# Patient Record
Sex: FEMALE | Race: WHITE | NOT HISPANIC OR LATINO | Employment: FULL TIME | URBAN - METROPOLITAN AREA
[De-identification: names, ages, dates, MRNs, and addresses within clinical notes are randomized per-mention and may not be internally consistent; named-entity substitution may affect disease eponyms.]

---

## 2018-05-08 ENCOUNTER — HISTORICAL (OUTPATIENT)
Dept: LAB | Facility: HOSPITAL | Age: 58
End: 2018-05-08

## 2018-05-08 LAB
ABS NEUT (OLG): 2.62
ALBUMIN SERPL-MCNC: 3.6 GM/DL (ref 3.4–5)
ALBUMIN/GLOB SERPL: 1 RATIO (ref 1.1–2)
ALP SERPL-CCNC: 115 UNIT/L (ref 46–116)
ALT SERPL-CCNC: 22 UNIT/L (ref 12–78)
APPEARANCE, UA: ABNORMAL
AST SERPL-CCNC: 10 UNIT/L (ref 10–37)
BACTERIA SPEC CULT: ABNORMAL
BASOPHILS # BLD AUTO: 0.03 X10(3)/MCL
BASOPHILS NFR BLD AUTO: 0.7 %
BILIRUB SERPL-MCNC: 0.5 MG/DL (ref 0.2–1)
BILIRUB UR QL STRIP: NEGATIVE
BILIRUBIN DIRECT+TOT PNL SERPL-MCNC: 0.1 MG/DL (ref 0–0.2)
BILIRUBIN DIRECT+TOT PNL SERPL-MCNC: 0.4 MG/DL
BUN SERPL-MCNC: 14 MG/DL (ref 7–18)
CALCIUM SERPL-MCNC: 8.7 MG/DL (ref 8.5–10.1)
CHLORIDE SERPL-SCNC: 105 MMOL/L (ref 98–107)
CHOLEST SERPL-MCNC: 214 MG/DL (ref 50–200)
CHOLEST/HDLC SERPL: 4 {RATIO} (ref 0–5)
CO2 SERPL-SCNC: 27.9 MMOL/L (ref 21–32)
COLOR UR: YELLOW
CREAT SERPL-MCNC: 0.93 MG/DL (ref 0.55–1.02)
EOSINOPHIL # BLD AUTO: 0.16 X10(3)/MCL
EOSINOPHIL NFR BLD AUTO: 3.7 %
ERYTHROCYTE [DISTWIDTH] IN BLOOD BY AUTOMATED COUNT: 14 %
EST. AVERAGE GLUCOSE BLD GHB EST-MCNC: 117 MG/DL
GLOBULIN SER-MCNC: 3.6 GM/DL (ref 2.4–3.5)
GLUCOSE (UA): NEGATIVE
GLUCOSE SERPL-MCNC: 108 MG/DL (ref 74–106)
HBA1C MFR BLD: 5.7 % (ref 4.5–6.2)
HCT VFR BLD AUTO: 39.8 % (ref 34–46)
HDLC SERPL-MCNC: 60 MG/DL (ref 35–60)
HGB BLD-MCNC: 12.9 GM/DL (ref 11.3–15.4)
HGB UR QL STRIP: NEGATIVE
IMM GRANULOCYTES # BLD AUTO: 0 10*3/UL (ref 0–0.1)
IMM GRANULOCYTES NFR BLD AUTO: 0 % (ref 0–1)
KETONES UR QL STRIP: NEGATIVE
LDLC SERPL CALC-MCNC: 134 MG/DL (ref 50–140)
LEUKOCYTE ESTERASE UR QL STRIP: ABNORMAL
LYMPHOCYTES # BLD AUTO: 1.22 X10(3)/MCL
LYMPHOCYTES NFR BLD AUTO: 28 %
MCH RBC QN AUTO: 27.9 PG (ref 27–33)
MCHC RBC AUTO-ENTMCNC: 32.4 GM/DL (ref 32–35)
MCV RBC AUTO: 86 FL (ref 81–97)
MONOCYTES # BLD AUTO: 0.33 X10(3)/MCL
MONOCYTES NFR BLD AUTO: 7.6 %
MUCOUS THREADS URNS QL MICRO: PRESENT
NEUTROPHILS # BLD AUTO: 2.62 X10(3)/MCL
NEUTROPHILS NFR BLD AUTO: 60 %
NITRITE UR QL STRIP: NEGATIVE
PH UR STRIP: 7 [PH] (ref 4.6–8)
PLATELET # BLD AUTO: 319 X10(3)/MCL (ref 151–368)
PMV BLD AUTO: 11 FL
POTASSIUM SERPL-SCNC: 4.3 MMOL/L (ref 3.5–5.1)
PROT SERPL-MCNC: 7.2 GM/DL (ref 6.4–8.2)
PROT UR QL STRIP: NEGATIVE
RBC # BLD AUTO: 4.63 X10(6)/MCL (ref 3.9–5)
RBC #/AREA URNS HPF: ABNORMAL /[HPF]
SODIUM SERPL-SCNC: 141 MMOL/L (ref 136–145)
SP GR UR STRIP: 1.02 (ref 1–1.03)
SQUAMOUS EPITHELIAL, UA: ABNORMAL
TRIGL SERPL-MCNC: 100 MG/DL (ref 30–150)
TSH SERPL-ACNC: 2.27 MIU/ML (ref 0.35–3.75)
UROBILINOGEN UR STRIP-ACNC: 1
VLDLC SERPL CALC-MCNC: 20 MG/DL
WBC # SPEC AUTO: 4.36 X10(3)/MCL (ref 3.4–9.2)
WBC #/AREA URNS HPF: ABNORMAL /HPF

## 2018-05-10 LAB — FINAL CULTURE: NORMAL

## 2018-08-22 LAB
CRC RECOMMENDATION EXT: NORMAL
CRC RECOMMENDATION EXT: NORMAL

## 2020-11-16 LAB
HUMAN PAPILLOMAVIRUS (HPV): NORMAL
PAP RECOMMENDATION EXT: NORMAL
PAP SMEAR: NORMAL

## 2020-11-19 LAB
HUMAN PAPILLOMAVIRUS (HPV): NORMAL
PAP RECOMMENDATION EXT: NORMAL
PAP SMEAR: NORMAL

## 2021-02-08 LAB — BCS RECOMMENDATION EXT: NORMAL

## 2021-12-23 ENCOUNTER — HISTORICAL (OUTPATIENT)
Dept: ADMINISTRATIVE | Facility: HOSPITAL | Age: 61
End: 2021-12-23

## 2022-03-07 ENCOUNTER — HISTORICAL (OUTPATIENT)
Dept: LAB | Facility: HOSPITAL | Age: 62
End: 2022-03-07

## 2022-03-07 LAB
ALBUMIN SERPL-MCNC: 3.8 G/DL (ref 3.4–4.8)
ALBUMIN/GLOB SERPL: 1.4 {RATIO} (ref 1.1–2)
ALP SERPL-CCNC: 100 U/L (ref 40–150)
ALT SERPL-CCNC: 15 U/L (ref 0–55)
AST SERPL-CCNC: 15 U/L (ref 5–34)
BILIRUB SERPL-MCNC: 0.4 MG/DL
BILIRUBIN DIRECT+TOT PNL SERPL-MCNC: 0.1 (ref 0–0.5)
BILIRUBIN DIRECT+TOT PNL SERPL-MCNC: 0.3
BUN SERPL-MCNC: 12 MG/DL (ref 9.8–20.1)
CALCIUM SERPL-MCNC: 9.3 MG/DL (ref 8.7–10.5)
CHLORIDE SERPL-SCNC: 108 MMOL/L (ref 98–107)
CHOLEST SERPL-MCNC: 209 MG/DL
CHOLEST/HDLC SERPL: 4 {RATIO} (ref 0–5)
CO2 SERPL-SCNC: 28 MMOL/L (ref 23–31)
CREAT SERPL-MCNC: 0.74 MG/DL (ref 0.55–1.02)
GLOBULIN SER-MCNC: 2.8 G/DL (ref 2.4–3.5)
GLUCOSE SERPL-MCNC: 107 MG/DL (ref 82–115)
HDLC SERPL-MCNC: 50 MG/DL (ref 35–60)
HEMOLYSIS INTERF INDEX SERPL-ACNC: 0
ICTERIC INTERF INDEX SERPL-ACNC: 1
LDLC SERPL CALC-MCNC: 136 MG/DL (ref 50–140)
LIPEMIC INTERF INDEX SERPL-ACNC: -1
POTASSIUM SERPL-SCNC: 4.7 MMOL/L (ref 3.5–5.1)
PROT SERPL-MCNC: 6.6 G/DL (ref 5.8–7.6)
SODIUM SERPL-SCNC: 143 MMOL/L (ref 136–145)
TRIGL SERPL-MCNC: 115 MG/DL (ref 37–140)
VLDLC SERPL CALC-MCNC: 23 MG/DL

## 2022-04-09 ENCOUNTER — HISTORICAL (OUTPATIENT)
Dept: ADMINISTRATIVE | Facility: HOSPITAL | Age: 62
End: 2022-04-09

## 2022-04-27 VITALS
SYSTOLIC BLOOD PRESSURE: 151 MMHG | DIASTOLIC BLOOD PRESSURE: 89 MMHG | HEIGHT: 61 IN | BODY MASS INDEX: 39.46 KG/M2 | OXYGEN SATURATION: 94 % | WEIGHT: 209 LBS

## 2022-06-24 ENCOUNTER — LAB VISIT (OUTPATIENT)
Dept: LAB | Facility: HOSPITAL | Age: 62
End: 2022-06-24
Attending: NURSE PRACTITIONER
Payer: MEDICAID

## 2022-06-24 ENCOUNTER — CLINICAL SUPPORT (OUTPATIENT)
Dept: FAMILY MEDICINE | Facility: CLINIC | Age: 62
End: 2022-06-24
Payer: MEDICAID

## 2022-06-24 DIAGNOSIS — E78.5 HYPERLIPIDEMIA, UNSPECIFIED HYPERLIPIDEMIA TYPE: ICD-10-CM

## 2022-06-24 DIAGNOSIS — Z00.00 WELLNESS EXAMINATION: ICD-10-CM

## 2022-06-24 DIAGNOSIS — E78.5 HYPERLIPIDEMIA, UNSPECIFIED HYPERLIPIDEMIA TYPE: Primary | ICD-10-CM

## 2022-06-24 LAB
ALBUMIN SERPL-MCNC: 4 GM/DL (ref 3.4–4.8)
ALBUMIN/GLOB SERPL: 1.5 RATIO (ref 1.1–2)
ALP SERPL-CCNC: 99 UNIT/L (ref 40–150)
ALT SERPL-CCNC: 13 UNIT/L (ref 0–55)
APPEARANCE UR: ABNORMAL
AST SERPL-CCNC: 14 UNIT/L (ref 5–34)
BACTERIA #/AREA URNS AUTO: ABNORMAL /HPF
BASOPHILS # BLD AUTO: 0.05 X10(3)/MCL (ref 0–0.2)
BASOPHILS NFR BLD AUTO: 1 %
BILIRUB UR QL STRIP.AUTO: NEGATIVE MG/DL
BILIRUBIN DIRECT+TOT PNL SERPL-MCNC: 0.5 MG/DL
BUN SERPL-MCNC: 9 MG/DL (ref 9.8–20.1)
CALCIUM SERPL-MCNC: 8.8 MG/DL (ref 8.4–10.2)
CHLORIDE SERPL-SCNC: 107 MMOL/L (ref 98–107)
CHOLEST SERPL-MCNC: 188 MG/DL
CHOLEST/HDLC SERPL: 3 {RATIO} (ref 0–5)
CO2 SERPL-SCNC: 28 MMOL/L (ref 23–31)
COLOR UR AUTO: YELLOW
CREAT SERPL-MCNC: 0.82 MG/DL (ref 0.55–1.02)
EOSINOPHIL # BLD AUTO: 0.22 X10(3)/MCL (ref 0–0.9)
EOSINOPHIL NFR BLD AUTO: 4.3 %
ERYTHROCYTE [DISTWIDTH] IN BLOOD BY AUTOMATED COUNT: 14.1 % (ref 11.5–17)
GLOBULIN SER-MCNC: 2.7 GM/DL (ref 2.4–3.5)
GLUCOSE SERPL-MCNC: 98 MG/DL (ref 82–115)
GLUCOSE UR QL STRIP.AUTO: NEGATIVE MG/DL
HCT VFR BLD AUTO: 41.3 % (ref 37–47)
HDLC SERPL-MCNC: 63 MG/DL (ref 35–60)
HGB BLD-MCNC: 12.6 GM/DL (ref 12–16)
IMM GRANULOCYTES # BLD AUTO: 0.01 X10(3)/MCL (ref 0–0.02)
IMM GRANULOCYTES NFR BLD AUTO: 0.2 % (ref 0–0.43)
KETONES UR QL STRIP.AUTO: NEGATIVE MG/DL
LDLC SERPL CALC-MCNC: 106 MG/DL (ref 50–140)
LEUKOCYTE ESTERASE UR QL STRIP.AUTO: NEGATIVE UNIT/L
LYMPHOCYTES # BLD AUTO: 1.55 X10(3)/MCL (ref 0.6–4.6)
LYMPHOCYTES NFR BLD AUTO: 30.2 %
MCH RBC QN AUTO: 27.8 PG (ref 27–31)
MCHC RBC AUTO-ENTMCNC: 30.5 MG/DL (ref 33–36)
MCV RBC AUTO: 91 FL (ref 80–94)
MONOCYTES # BLD AUTO: 0.38 X10(3)/MCL (ref 0.1–1.3)
MONOCYTES NFR BLD AUTO: 7.4 %
MUCOUS THREADS URNS QL MICRO: ABNORMAL /LPF
NEUTROPHILS # BLD AUTO: 2.9 X10(3)/MCL (ref 2.1–9.2)
NEUTROPHILS NFR BLD AUTO: 56.9 %
NITRITE UR QL STRIP.AUTO: NEGATIVE
NRBC BLD AUTO-RTO: 0 %
PH UR STRIP.AUTO: 7.5 [PH]
PLATELET # BLD AUTO: 312 X10(3)/MCL (ref 130–400)
PMV BLD AUTO: 12 FL (ref 9.4–12.4)
POTASSIUM SERPL-SCNC: 4.8 MMOL/L (ref 3.5–5.1)
PROT SERPL-MCNC: 6.7 GM/DL (ref 5.8–7.6)
PROT UR QL STRIP.AUTO: NEGATIVE MG/DL
RBC # BLD AUTO: 4.54 X10(6)/MCL (ref 4.2–5.4)
RBC #/AREA URNS AUTO: ABNORMAL /HPF
RBC UR QL AUTO: NEGATIVE UNIT/L
SODIUM SERPL-SCNC: 143 MMOL/L (ref 136–145)
SP GR UR STRIP.AUTO: 1.02
SQUAMOUS #/AREA URNS AUTO: ABNORMAL /HPF
TRIGL SERPL-MCNC: 95 MG/DL (ref 37–140)
TSH SERPL-ACNC: 1.57 UIU/ML (ref 0.35–4.94)
UROBILINOGEN UR STRIP-ACNC: 1 MG/DL
VLDLC SERPL CALC-MCNC: 19 MG/DL
WBC # SPEC AUTO: 5.1 X10(3)/MCL (ref 4.5–11.5)
WBC #/AREA URNS AUTO: ABNORMAL /HPF

## 2022-06-24 PROCEDURE — 85025 COMPLETE CBC W/AUTO DIFF WBC: CPT

## 2022-06-24 PROCEDURE — 84443 ASSAY THYROID STIM HORMONE: CPT

## 2022-06-24 PROCEDURE — 80061 LIPID PANEL: CPT

## 2022-06-24 PROCEDURE — 36415 COLL VENOUS BLD VENIPUNCTURE: CPT

## 2022-06-24 PROCEDURE — 82306 VITAMIN D 25 HYDROXY: CPT

## 2022-06-24 PROCEDURE — 80053 COMPREHEN METABOLIC PANEL: CPT

## 2022-06-25 LAB — DEPRECATED CALCIDIOL+CALCIFEROL SERPL-MC: 33.6 NG/ML (ref 30–80)

## 2022-06-29 ENCOUNTER — OFFICE VISIT (OUTPATIENT)
Dept: FAMILY MEDICINE | Facility: CLINIC | Age: 62
End: 2022-06-29
Payer: MEDICAID

## 2022-06-29 VITALS
WEIGHT: 197 LBS | BODY MASS INDEX: 37.19 KG/M2 | SYSTOLIC BLOOD PRESSURE: 151 MMHG | HEART RATE: 57 BPM | RESPIRATION RATE: 20 BRPM | DIASTOLIC BLOOD PRESSURE: 81 MMHG | TEMPERATURE: 97 F | HEIGHT: 61 IN

## 2022-06-29 DIAGNOSIS — M54.41 CHRONIC BILATERAL LOW BACK PAIN WITH BILATERAL SCIATICA: Primary | ICD-10-CM

## 2022-06-29 DIAGNOSIS — G89.29 CHRONIC BILATERAL LOW BACK PAIN WITH BILATERAL SCIATICA: Primary | ICD-10-CM

## 2022-06-29 DIAGNOSIS — E66.9 CLASS 2 OBESITY WITH BODY MASS INDEX (BMI) OF 37.0 TO 37.9 IN ADULT, UNSPECIFIED OBESITY TYPE, UNSPECIFIED WHETHER SERIOUS COMORBIDITY PRESENT: ICD-10-CM

## 2022-06-29 DIAGNOSIS — M54.42 CHRONIC BILATERAL LOW BACK PAIN WITH BILATERAL SCIATICA: Primary | ICD-10-CM

## 2022-06-29 PROBLEM — E66.812 CLASS 2 OBESITY WITH BODY MASS INDEX (BMI) OF 37.0 TO 37.9 IN ADULT: Status: ACTIVE | Noted: 2022-06-29

## 2022-06-29 PROCEDURE — 3079F PR MOST RECENT DIASTOLIC BLOOD PRESSURE 80-89 MM HG: ICD-10-PCS | Mod: CPTII,,, | Performed by: NURSE PRACTITIONER

## 2022-06-29 PROCEDURE — 3079F DIAST BP 80-89 MM HG: CPT | Mod: CPTII,,, | Performed by: NURSE PRACTITIONER

## 2022-06-29 PROCEDURE — 99215 OFFICE O/P EST HI 40 MIN: CPT | Mod: ,,, | Performed by: NURSE PRACTITIONER

## 2022-06-29 PROCEDURE — 3077F PR MOST RECENT SYSTOLIC BLOOD PRESSURE >= 140 MM HG: ICD-10-PCS | Mod: CPTII,,, | Performed by: NURSE PRACTITIONER

## 2022-06-29 PROCEDURE — 3008F PR BODY MASS INDEX (BMI) DOCUMENTED: ICD-10-PCS | Mod: CPTII,,, | Performed by: NURSE PRACTITIONER

## 2022-06-29 PROCEDURE — 99215 PR OFFICE/OUTPT VISIT, EST, LEVL V, 40-54 MIN: ICD-10-PCS | Mod: ,,, | Performed by: NURSE PRACTITIONER

## 2022-06-29 PROCEDURE — 3008F BODY MASS INDEX DOCD: CPT | Mod: CPTII,,, | Performed by: NURSE PRACTITIONER

## 2022-06-29 PROCEDURE — 1159F MED LIST DOCD IN RCRD: CPT | Mod: CPTII,,, | Performed by: NURSE PRACTITIONER

## 2022-06-29 PROCEDURE — 3077F SYST BP >= 140 MM HG: CPT | Mod: CPTII,,, | Performed by: NURSE PRACTITIONER

## 2022-06-29 PROCEDURE — 1159F PR MEDICATION LIST DOCUMENTED IN MEDICAL RECORD: ICD-10-PCS | Mod: CPTII,,, | Performed by: NURSE PRACTITIONER

## 2022-06-29 RX ORDER — OMEPRAZOLE 20 MG/1
20 CAPSULE, DELAYED RELEASE ORAL DAILY
COMMUNITY

## 2022-06-29 RX ORDER — LANOLIN ALCOHOL/MO/W.PET/CERES
1000 CREAM (GRAM) TOPICAL DAILY
COMMUNITY
End: 2024-03-25

## 2022-06-29 RX ORDER — GLUCOSAMINE/CHONDRO SU A 500-400 MG
1 TABLET ORAL 3 TIMES DAILY
COMMUNITY
End: 2023-10-13

## 2022-06-29 RX ORDER — TURMERIC 400 MG
750 CAPSULE ORAL DAILY
COMMUNITY
End: 2023-10-13

## 2022-06-29 RX ORDER — ASPIRIN 81 MG/1
81 TABLET ORAL DAILY
COMMUNITY

## 2022-06-29 RX ORDER — GABAPENTIN 300 MG/1
300 CAPSULE ORAL NIGHTLY
Qty: 30 CAPSULE | Refills: 6 | Status: SHIPPED | OUTPATIENT
Start: 2022-06-29 | End: 2023-04-13

## 2022-06-29 NOTE — PROGRESS NOTES
Subjective:       Patient ID: Jasmyne Bone is a 61 y.o. female.    Chief Complaint: Follow-up (Follow up; lab results) and Pain (Nerve pain in back X's 2 years that is getting worse)    Vitals:    06/29/22 0802   BP: (!) 151/81   Pulse: (!) 57   Resp: 20   Temp: 97.3 °F (36.3 °C)        The patient is a 61-year-old female who presents with a history of hyperlipidemia and obesity.  See the patient's lipid panel has improved with the total cholesterol of 188 an HDL of 63. Her ratio is now 3 instead of 4.      Today she is complaining of low back pain in the region of the but the coccyx that radiates across the buttocks bilaterally and down bilateral legs.         Back Pain  This is a chronic problem. The current episode started more than 1 year ago. Episode frequency: 8-9 times daily. The problem has been gradually worsening since onset. The pain is present in the lumbar spine (Coccyx). The quality of the pain is described as burning. Radiates to: ACross buttock bilaterally to bilateral legs. The pain is at a severity of 9/10. The pain is severe. The pain is worse during the night. The symptoms are aggravated by bending, lying down, position, sitting and twisting. Stiffness is present in the morning. Risk factors include obesity. She has tried home exercises (weight loss) for the symptoms. The treatment provided mild relief.       Review of Systems   Musculoskeletal: Positive for back pain.   All other systems reviewed and are negative.          Objective:        Physical Exam  Vitals and nursing note reviewed.   Constitutional:       Appearance: She is obese.   HENT:      Head: Normocephalic.      Right Ear: Tympanic membrane normal.      Left Ear: Tympanic membrane normal.      Nose: Nose normal.      Mouth/Throat:      Mouth: Mucous membranes are moist.   Eyes:      Extraocular Movements: Extraocular movements intact.   Cardiovascular:      Rate and Rhythm: Normal rate.      Pulses: Normal pulses.      Heart  sounds: No murmur heard.  Pulmonary:      Effort: Pulmonary effort is normal.      Breath sounds: Normal breath sounds.   Abdominal:      General: Abdomen is flat.      Palpations: Abdomen is soft.   Musculoskeletal:         General: Tenderness present.      Cervical back: Normal range of motion and neck supple.      Comments: Limited ROM on flexion, extension, and lateral movement due to pain.    Skin:     General: Skin is warm and dry.   Neurological:      Mental Status: She is alert and oriented to person, place, and time.         Assessment:     low back pain with sciatica    Obesity  Problem List Items Addressed This Visit    None         Plan:     trial of gabapentin at night, x-rays of the lumbar spine.      discussion on continued weight loss  Past Medical History:   Diagnosis Date    Hyperlipidemia         Review of patient's allergies indicates:  No Known Allergies     Current Outpatient Medications   Medication Sig Dispense Refill    aspirin (ECOTRIN) 81 MG EC tablet Take 81 mg by mouth once daily.      cyanocobalamin (VITAMIN B-12) 1000 MCG tablet Take 1,000 mcg by mouth once daily.      glucosamine-chondroitin 500-400 mg tablet Take 1 tablet by mouth 3 (three) times daily.      omeprazole (PRILOSEC) 20 MG capsule Take 20 mg by mouth once daily.      turmeric 400 mg Cap Take 750 mg by mouth once daily at 6am.       No current facility-administered medications for this visit.          There is no problem list on file for this patient.            Past Medical History:   Diagnosis Date    Hyperlipidemia           Past Surgical History:   Procedure Laterality Date    TONSILLECTOMY             reports that she quit smoking about 42 years ago. She has never used smokeless tobacco. She reports current alcohol use of about 5.0 - 6.0 standard drinks of alcohol per week. She reports that she does not use drugs.      There is no immunization history on file for this patient.     Health Maintenance   Topic  Date Due    Hepatitis C Screening  Never done    TETANUS VACCINE  Never done    Mammogram  12/02/2020    Lipid Panel  06/24/2027

## 2022-08-01 ENCOUNTER — HOSPITAL ENCOUNTER (OUTPATIENT)
Dept: RADIOLOGY | Facility: HOSPITAL | Age: 62
Discharge: HOME | End: 2022-08-01
Attending: NURSE PRACTITIONER
Payer: MEDICAID

## 2022-08-01 DIAGNOSIS — M54.42 ACUTE BACK PAIN WITH SCIATICA, LEFT: Primary | ICD-10-CM

## 2022-08-01 DIAGNOSIS — M54.42 ACUTE BACK PAIN WITH SCIATICA, LEFT: ICD-10-CM

## 2022-08-01 PROCEDURE — 72100 X-RAY EXAM L-S SPINE 2/3 VWS: CPT | Mod: TC

## 2022-08-03 ENCOUNTER — OFFICE VISIT (OUTPATIENT)
Dept: FAMILY MEDICINE | Facility: CLINIC | Age: 62
End: 2022-08-03
Payer: MEDICAID

## 2022-08-03 VITALS
HEART RATE: 64 BPM | SYSTOLIC BLOOD PRESSURE: 150 MMHG | HEIGHT: 61 IN | BODY MASS INDEX: 36.63 KG/M2 | WEIGHT: 194 LBS | TEMPERATURE: 97 F | DIASTOLIC BLOOD PRESSURE: 84 MMHG | RESPIRATION RATE: 20 BRPM

## 2022-08-03 DIAGNOSIS — M54.9 DORSALGIA, UNSPECIFIED: ICD-10-CM

## 2022-08-03 DIAGNOSIS — M79.604 LUMBAR PAIN WITH RADIATION DOWN BOTH LEGS: ICD-10-CM

## 2022-08-03 DIAGNOSIS — M54.50 LUMBAR PAIN WITH RADIATION DOWN BOTH LEGS: ICD-10-CM

## 2022-08-03 DIAGNOSIS — M79.605 LUMBAR PAIN WITH RADIATION DOWN BOTH LEGS: ICD-10-CM

## 2022-08-03 PROCEDURE — 99215 OFFICE O/P EST HI 40 MIN: CPT | Mod: 25,,, | Performed by: NURSE PRACTITIONER

## 2022-08-03 PROCEDURE — 3008F BODY MASS INDEX DOCD: CPT | Mod: CPTII,,, | Performed by: NURSE PRACTITIONER

## 2022-08-03 PROCEDURE — 3077F PR MOST RECENT SYSTOLIC BLOOD PRESSURE >= 140 MM HG: ICD-10-PCS | Mod: CPTII,,, | Performed by: NURSE PRACTITIONER

## 2022-08-03 PROCEDURE — 3008F PR BODY MASS INDEX (BMI) DOCUMENTED: ICD-10-PCS | Mod: CPTII,,, | Performed by: NURSE PRACTITIONER

## 2022-08-03 PROCEDURE — 3077F SYST BP >= 140 MM HG: CPT | Mod: CPTII,,, | Performed by: NURSE PRACTITIONER

## 2022-08-03 PROCEDURE — 20552 PR INJECT TRIGGER POINT, 1 OR 2: ICD-10-PCS | Mod: ,,, | Performed by: NURSE PRACTITIONER

## 2022-08-03 PROCEDURE — 1159F MED LIST DOCD IN RCRD: CPT | Mod: CPTII,,, | Performed by: NURSE PRACTITIONER

## 2022-08-03 PROCEDURE — 1159F PR MEDICATION LIST DOCUMENTED IN MEDICAL RECORD: ICD-10-PCS | Mod: CPTII,,, | Performed by: NURSE PRACTITIONER

## 2022-08-03 PROCEDURE — 99215 PR OFFICE/OUTPT VISIT, EST, LEVL V, 40-54 MIN: ICD-10-PCS | Mod: 25,,, | Performed by: NURSE PRACTITIONER

## 2022-08-03 PROCEDURE — 3079F PR MOST RECENT DIASTOLIC BLOOD PRESSURE 80-89 MM HG: ICD-10-PCS | Mod: CPTII,,, | Performed by: NURSE PRACTITIONER

## 2022-08-03 PROCEDURE — 3079F DIAST BP 80-89 MM HG: CPT | Mod: CPTII,,, | Performed by: NURSE PRACTITIONER

## 2022-08-03 PROCEDURE — 20552 NJX 1/MLT TRIGGER POINT 1/2: CPT | Mod: ,,, | Performed by: NURSE PRACTITIONER

## 2022-08-03 RX ORDER — NAPROXEN 500 MG/1
500 TABLET ORAL 2 TIMES DAILY WITH MEALS
Qty: 20 TABLET | Refills: 1 | Status: SHIPPED | OUTPATIENT
Start: 2022-08-03 | End: 2022-08-13

## 2022-08-03 NOTE — PROGRESS NOTES
Subjective:       Patient ID: Jasmyne Bone is a 61 y.o. female.    Chief Complaint: Follow-up (1 month follow up)    The patient is a 61-year-old female who presents to go over her x-ray of the lumbar spine.  The x-ray does show some moderate foramen narrowing L3 and L4.  However this does not account for severity of the pain the patient is experiencing.    The patient states the pain is 7/10 pain on the numeric scale.  Character of the pain is burning.  Patient states that the back of her calves ilaterally get numb and that her feet get numb.  The patient states this numbness is intermittent but is happening more frequently.  Patient states the pain is severe enough to keep her awake at night.  The patient has been using gabapentin with moderate success.    Review of Systems   Musculoskeletal: Positive for arthralgias, back pain, gait problem and leg pain.        See HPI   All other systems reviewed and are negative.        Objective:      Physical Exam  Vitals and nursing note reviewed.   Constitutional:       Appearance: She is obese.   HENT:      Head: Normocephalic.      Right Ear: Tympanic membrane normal.      Left Ear: Tympanic membrane normal.      Nose: Nose normal.      Mouth/Throat:      Mouth: Mucous membranes are moist.   Eyes:      Extraocular Movements: Extraocular movements intact.   Cardiovascular:      Rate and Rhythm: Normal rate and regular rhythm.      Heart sounds: No murmur heard.  Pulmonary:      Effort: Pulmonary effort is normal.      Breath sounds: Normal breath sounds.   Abdominal:      General: Bowel sounds are normal.      Palpations: Abdomen is soft.   Musculoskeletal:         General: Tenderness present.      Cervical back: Normal range of motion and neck supple.      Comments: The patient had severe pain on extension of back.  The patient has soreness on the lateral movement.  The patient has severe discomfort when arising from a chair or bed.   Neurological:      Mental Status:  She is alert.         Assessment:       Problem List Items Addressed This Visit        Orthopedic    Dorsalgia, unspecified    Relevant Medications    naproxen (NAPROSYN) 500 MG tablet    Other Relevant Orders    MRI Lumbar Spine Without Contrast          Plan:     Procedure:    Consent signed and patient allowed to ask any questions.  Prep cleansing with Betadine and alcohol swabs.    Depo-Medrol 40 mg & 1cc !% lidocaine Trigger point injection into the most tender trigger point right low back adjacent to the spine. (NDC [address]-1)  MRI ordered  Take medications as ordered   Return to clinic in 3 months.  Patient instructed to call with any questions or problems.

## 2022-08-08 ENCOUNTER — TELEPHONE (OUTPATIENT)
Dept: FAMILY MEDICINE | Facility: CLINIC | Age: 62
End: 2022-08-08
Payer: MEDICAID

## 2022-08-22 PROCEDURE — 99211 OFF/OP EST MAY X REQ PHY/QHP: CPT | Mod: ,,, | Performed by: NURSE PRACTITIONER

## 2022-08-22 PROCEDURE — 99211 PR OFFICE/OUTPT VISIT, EST, LEVL I: ICD-10-PCS | Mod: ,,, | Performed by: NURSE PRACTITIONER

## 2022-08-22 RX ORDER — METHYLPREDNISOLONE ACETATE 40 MG/ML
40 INJECTION, SUSPENSION INTRA-ARTICULAR; INTRALESIONAL; INTRAMUSCULAR; SOFT TISSUE
Status: COMPLETED | OUTPATIENT
Start: 2022-08-03 | End: 2022-08-22

## 2022-08-22 RX ADMIN — METHYLPREDNISOLONE ACETATE 40 MG: 40 INJECTION, SUSPENSION INTRA-ARTICULAR; INTRALESIONAL; INTRAMUSCULAR; SOFT TISSUE at 03:08

## 2022-10-24 ENCOUNTER — DOCUMENTATION ONLY (OUTPATIENT)
Dept: ADMINISTRATIVE | Facility: HOSPITAL | Age: 62
End: 2022-10-24
Payer: MEDICAID

## 2022-11-14 ENCOUNTER — DOCUMENTATION ONLY (OUTPATIENT)
Dept: ADMINISTRATIVE | Facility: HOSPITAL | Age: 62
End: 2022-11-14
Payer: MEDICAID

## 2023-01-19 ENCOUNTER — PATIENT MESSAGE (OUTPATIENT)
Dept: ADMINISTRATIVE | Facility: HOSPITAL | Age: 63
End: 2023-01-19

## 2023-03-01 ENCOUNTER — OFFICE VISIT (OUTPATIENT)
Dept: FAMILY MEDICINE | Facility: CLINIC | Age: 63
End: 2023-03-01
Payer: MEDICAID

## 2023-03-01 VITALS — WEIGHT: 195 LBS | HEIGHT: 61 IN | BODY MASS INDEX: 36.82 KG/M2

## 2023-03-01 DIAGNOSIS — R53.83 FATIGUE, UNSPECIFIED TYPE: ICD-10-CM

## 2023-03-01 DIAGNOSIS — Z00.00 WELLNESS EXAMINATION: ICD-10-CM

## 2023-03-01 DIAGNOSIS — F41.9 ANXIETY: ICD-10-CM

## 2023-03-01 PROCEDURE — 3008F BODY MASS INDEX DOCD: CPT | Mod: CPTII,,, | Performed by: NURSE PRACTITIONER

## 2023-03-01 PROCEDURE — 99213 PR OFFICE/OUTPT VISIT, EST, LEVL III, 20-29 MIN: ICD-10-PCS | Mod: ,,, | Performed by: NURSE PRACTITIONER

## 2023-03-01 PROCEDURE — 3008F PR BODY MASS INDEX (BMI) DOCUMENTED: ICD-10-PCS | Mod: CPTII,,, | Performed by: NURSE PRACTITIONER

## 2023-03-01 PROCEDURE — 99213 OFFICE O/P EST LOW 20 MIN: CPT | Mod: ,,, | Performed by: NURSE PRACTITIONER

## 2023-03-01 RX ORDER — HYDROXYZINE PAMOATE 25 MG/1
25 CAPSULE ORAL EVERY 6 HOURS PRN
Qty: 120 CAPSULE | Refills: 2 | Status: SHIPPED | OUTPATIENT
Start: 2023-03-01 | End: 2023-03-31

## 2023-03-01 NOTE — PROGRESS NOTES
"Subjective:       Patient ID: Jasmyne Bone is a 62 y.o. female.    Chief Complaint:  Stress and anxiety for several months    The patient is dealing with stress at home and is having to work to 3 jobs to manage financially.  The patient has a  ill has lost mobility.  The patient's  falls often and is very unstable.    Review of [id number] point review of systems conducted, negative except as stated in the history of present illness. See HPI for details.      Objective:      Visit Vitals  Ht 5' 1" (1.549 m)   Wt 88.5 kg (195 lb)   BMI 36.84 kg/m²     Physical Exam  Vitals and nursing note reviewed.   Constitutional:       Appearance: She is obese.   HENT:      Head: Normocephalic.      Right Ear: Tympanic membrane normal.      Left Ear: Tympanic membrane normal.      Nose: Congestion present.      Mouth/Throat:      Pharynx: Oropharyngeal exudate present.   Eyes:      Extraocular Movements: Extraocular movements intact.   Cardiovascular:      Rate and Rhythm: Normal rate and regular rhythm.      Pulses: Normal pulses.      Heart sounds: Normal heart sounds.   Pulmonary:      Effort: Pulmonary effort is normal.      Breath sounds: Normal breath sounds.   Abdominal:      General: Bowel sounds are normal.      Palpations: Abdomen is soft.   Musculoskeletal:         General: Normal range of motion.      Cervical back: Normal range of motion and neck supple.   Skin:     General: Skin is warm.   Neurological:      Mental Status: She is alert and oriented to person, place, and time.     Current Outpatient Medications   Medication Instructions    aspirin (ECOTRIN) 81 mg, Oral, Daily    cyanocobalamin (VITAMIN B-12) 1,000 mcg, Oral, Daily    gabapentin (NEURONTIN) 300 mg, Oral, Nightly    glucosamine-chondroitin 500-400 mg tablet 1 tablet, Oral, 3 times daily    hydrOXYzine pamoate (VISTARIL) 25 mg, Oral, Every 6 hours PRN    omeprazole (PRILOSEC) 20 mg, Oral, Daily    turmeric 750 mg, Oral, Daily     has No " Known Allergies.       Assessment:         ICD-10-CM ICD-9-CM   1. Anxiety  F41.9 300.00   2. Fatigue, unspecified type  R53.83 780.79   3. Wellness examination  Z00.00 V70.0          Plan:       1. Anxiety  Patient declines to take SSRIs.  - hydrOXYzine pamoate (VISTARIL) 25 MG Cap; Take 1 capsule (25 mg total) by mouth every 6 (six) hours as needed (anxiety).  Dispense: 120 capsule; Refill: 2    2. Fatigue, unspecified type  - CBC Auto Differential; Future  - Comprehensive Metabolic Panel; Future  - Lipid Panel; Future  - TSH; Future  - Hemoglobin A1C; Future  - Urinalysis; Future  - HIV 1/2 Ag/Ab (4th Gen); Future  - Hepatitis C Virus Quantitative; Future  - Urinalysis    3. Wellness examination  Patient to return 1 month  - CBC Auto Differential; Future  - Comprehensive Metabolic Panel; Future  - Lipid Panel; Future  - TSH; Future  - Hemoglobin A1C; Future  - Urinalysis; Future  - HIV 1/2 Ag/Ab (4th Gen); Future  - Hepatitis C Virus Quantitative; Future  - Urinalysis.        Follow up in about 4 weeks (around 3/29/2023).     Future Appointments   Date Time Provider Department Center   3/16/2023  8:00 AM NURSE, [facility]   4/4/2023  1:30 PM Cassia Lugo NP [facility] [place]

## 2023-03-16 ENCOUNTER — LAB VISIT (OUTPATIENT)
Dept: LAB | Facility: HOSPITAL | Age: 63
End: 2023-03-16
Attending: NURSE PRACTITIONER
Payer: MEDICAID

## 2023-03-16 ENCOUNTER — CLINICAL SUPPORT (OUTPATIENT)
Dept: FAMILY MEDICINE | Facility: CLINIC | Age: 63
End: 2023-03-16

## 2023-03-16 DIAGNOSIS — Z00.00 WELLNESS EXAMINATION: ICD-10-CM

## 2023-03-16 DIAGNOSIS — R53.83 FATIGUE, UNSPECIFIED TYPE: ICD-10-CM

## 2023-03-16 LAB
APPEARANCE UR: ABNORMAL
BACTERIA #/AREA URNS AUTO: ABNORMAL /HPF
BASOPHILS # BLD AUTO: 0.06 X10(3)/MCL (ref 0–0.2)
BASOPHILS NFR BLD AUTO: 1.1 %
BILIRUB UR QL STRIP.AUTO: NEGATIVE MG/DL
COLOR UR AUTO: YELLOW
EOSINOPHIL # BLD AUTO: 0.29 X10(3)/MCL (ref 0–0.9)
EOSINOPHIL NFR BLD AUTO: 5.1 %
ERYTHROCYTE [DISTWIDTH] IN BLOOD BY AUTOMATED COUNT: 13.7 % (ref 11.5–17)
GLUCOSE UR QL STRIP.AUTO: NEGATIVE MG/DL
HCT VFR BLD AUTO: 40.1 % (ref 37–47)
HGB BLD-MCNC: 12.8 G/DL (ref 12–16)
IMM GRANULOCYTES # BLD AUTO: 0 X10(3)/MCL (ref 0–0.04)
IMM GRANULOCYTES NFR BLD AUTO: 0 %
KETONES UR QL STRIP.AUTO: NEGATIVE MG/DL
LEUKOCYTE ESTERASE UR QL STRIP.AUTO: ABNORMAL UNIT/L
LYMPHOCYTES # BLD AUTO: 1.39 X10(3)/MCL (ref 0.6–4.6)
LYMPHOCYTES NFR BLD AUTO: 24.3 %
MCH RBC QN AUTO: 28.6 PG
MCHC RBC AUTO-ENTMCNC: 31.9 G/DL (ref 33–36)
MCV RBC AUTO: 89.5 FL (ref 80–94)
MONOCYTES # BLD AUTO: 0.41 X10(3)/MCL (ref 0.1–1.3)
MONOCYTES NFR BLD AUTO: 7.2 %
NEUTROPHILS # BLD AUTO: 3.56 X10(3)/MCL (ref 2.1–9.2)
NEUTROPHILS NFR BLD AUTO: 62.3 %
NITRITE UR QL STRIP.AUTO: POSITIVE
NRBC BLD AUTO-RTO: 0 %
PH UR STRIP.AUTO: 6 [PH]
PLATELET # BLD AUTO: 314 X10(3)/MCL (ref 130–400)
PMV BLD AUTO: 11.3 FL (ref 7.4–10.4)
PROT UR QL STRIP.AUTO: NEGATIVE MG/DL
RBC # BLD AUTO: 4.48 X10(6)/MCL (ref 4.2–5.4)
RBC #/AREA URNS AUTO: ABNORMAL /HPF
RBC UR QL AUTO: NEGATIVE UNIT/L
SP GR UR STRIP.AUTO: 1.02
SQUAMOUS #/AREA URNS AUTO: ABNORMAL /HPF
UROBILINOGEN UR STRIP-ACNC: 0.2 MG/DL
WBC # SPEC AUTO: 5.7 X10(3)/MCL (ref 4.5–11.5)
WBC #/AREA URNS AUTO: ABNORMAL /HPF

## 2023-03-16 PROCEDURE — 36415 COLL VENOUS BLD VENIPUNCTURE: CPT

## 2023-03-16 PROCEDURE — 86803 HEPATITIS C AB TEST: CPT

## 2023-03-16 PROCEDURE — 84443 ASSAY THYROID STIM HORMONE: CPT

## 2023-03-16 PROCEDURE — 85025 COMPLETE CBC W/AUTO DIFF WBC: CPT

## 2023-03-16 PROCEDURE — 80061 LIPID PANEL: CPT

## 2023-03-16 PROCEDURE — 80053 COMPREHEN METABOLIC PANEL: CPT

## 2023-03-16 PROCEDURE — 83036 HEMOGLOBIN GLYCOSYLATED A1C: CPT

## 2023-03-16 PROCEDURE — 87389 HIV-1 AG W/HIV-1&-2 AB AG IA: CPT

## 2023-03-17 LAB
ALBUMIN SERPL-MCNC: 3.9 G/DL (ref 3.4–4.8)
ALBUMIN/GLOB SERPL: 1.4 RATIO (ref 1.1–2)
ALP SERPL-CCNC: 94 UNIT/L (ref 40–150)
ALT SERPL-CCNC: 9 UNIT/L (ref 0–55)
AST SERPL-CCNC: 12 UNIT/L (ref 5–34)
BILIRUBIN DIRECT+TOT PNL SERPL-MCNC: 0.5 MG/DL
BUN SERPL-MCNC: 16 MG/DL (ref 9.8–20.1)
CALCIUM SERPL-MCNC: 9.5 MG/DL (ref 8.4–10.2)
CHLORIDE SERPL-SCNC: 107 MMOL/L (ref 98–107)
CHOLEST SERPL-MCNC: 226 MG/DL
CHOLEST/HDLC SERPL: 4 {RATIO} (ref 0–5)
CO2 SERPL-SCNC: 27 MMOL/L (ref 23–31)
CREAT SERPL-MCNC: 0.87 MG/DL (ref 0.55–1.02)
EST. AVERAGE GLUCOSE BLD GHB EST-MCNC: 105.4 MG/DL
GFR SERPLBLD CREATININE-BSD FMLA CKD-EPI: >60 MLS/MIN/1.73/M2
GLOBULIN SER-MCNC: 2.7 GM/DL (ref 2.4–3.5)
GLUCOSE SERPL-MCNC: 88 MG/DL (ref 82–115)
HBA1C MFR BLD: 5.3 %
HCV AB SERPL QL IA: NONREACTIVE
HDLC SERPL-MCNC: 61 MG/DL (ref 35–60)
HIV 1+2 AB+HIV1 P24 AG SERPL QL IA: NONREACTIVE
LDLC SERPL CALC-MCNC: 143 MG/DL (ref 50–140)
POTASSIUM SERPL-SCNC: 5.3 MMOL/L (ref 3.5–5.1)
PROT SERPL-MCNC: 6.6 GM/DL (ref 5.8–7.6)
SODIUM SERPL-SCNC: 143 MMOL/L (ref 136–145)
TRIGL SERPL-MCNC: 108 MG/DL (ref 37–140)
TSH SERPL-ACNC: 2.23 UIU/ML (ref 0.35–4.94)
VLDLC SERPL CALC-MCNC: 22 MG/DL

## 2023-03-19 LAB — BACTERIA UR CULT: ABNORMAL

## 2023-03-28 ENCOUNTER — PATIENT OUTREACH (OUTPATIENT)
Dept: ADMINISTRATIVE | Facility: HOSPITAL | Age: 63
End: 2023-03-28

## 2023-03-28 DIAGNOSIS — Z12.31 SCREENING MAMMOGRAM FOR BREAST CANCER: Primary | ICD-10-CM

## 2023-03-28 NOTE — LETTER
AUTHORIZATION FOR RELEASE OF   CONFIDENTIAL INFORMATION    Dear [facility],    We are seeing Jasmyne Bone, date of birth 1960, in the clinic at [facility]. Cassia Lugo NP is the patient's PCP. Jasmyne Bone has an outstanding lab/procedure at the time we reviewed her chart. In order to help keep her health information updated, she has authorized us to request the following medical record(s):        ( X )  MAMMOGRAM                                      (  )  COLONOSCOPY      (  )  PAP SMEAR                                          (  )  OUTSIDE LAB RESULTS     (  )  DEXA SCAN                                          (  )  EYE EXAM            (  )  FOOT EXAM                                          (  )  ENTIRE RECORD     (  )  OUTSIDE IMMUNIZATIONS                 (  )  _______________         Please fax records to [facility], Helen Love, NP, [phone]      If you have any questions, please contact Rafia  at [phone].           Patient Name: Jasmyne Bone  : 1960  Patient Phone #: [phone]

## 2023-03-28 NOTE — PROGRESS NOTES
Health Maintenance .    MAMMOGRAM SCREENING - no record found. Last Mammogram  2021 per patient @ [facility].   New order faxed to [facility], patient will call to schedule.

## 2023-04-13 ENCOUNTER — OFFICE VISIT (OUTPATIENT)
Dept: FAMILY MEDICINE | Facility: CLINIC | Age: 63
End: 2023-04-13
Payer: MEDICAID

## 2023-04-13 VITALS
DIASTOLIC BLOOD PRESSURE: 80 MMHG | TEMPERATURE: 97 F | RESPIRATION RATE: 20 BRPM | HEIGHT: 61 IN | SYSTOLIC BLOOD PRESSURE: 148 MMHG | HEART RATE: 66 BPM | WEIGHT: 196 LBS | BODY MASS INDEX: 37 KG/M2

## 2023-04-13 DIAGNOSIS — F32.9 REACTIVE DEPRESSION: ICD-10-CM

## 2023-04-13 DIAGNOSIS — R82.90 ABNORMAL URINALYSIS: ICD-10-CM

## 2023-04-13 DIAGNOSIS — E66.9 CLASS 2 OBESITY WITH BODY MASS INDEX (BMI) OF 37.0 TO 37.9 IN ADULT, UNSPECIFIED OBESITY TYPE, UNSPECIFIED WHETHER SERIOUS COMORBIDITY PRESENT: ICD-10-CM

## 2023-04-13 DIAGNOSIS — F32.A DEPRESSION, UNSPECIFIED DEPRESSION TYPE: Primary | ICD-10-CM

## 2023-04-13 DIAGNOSIS — F41.9 ANXIETY: ICD-10-CM

## 2023-04-13 DIAGNOSIS — I10 HYPERTENSION, UNSPECIFIED TYPE: ICD-10-CM

## 2023-04-13 LAB
BILIRUB SERPL-MCNC: NORMAL MG/DL
BLOOD URINE, POC: NORMAL
CLARITY, POC UA: CLEAR
COLOR, POC UA: YELLOW
GLUCOSE UR QL STRIP: NORMAL
KETONES UR QL STRIP: NORMAL
LEUKOCYTE ESTERASE URINE, POC: NORMAL
NITRITE, POC UA: NORMAL
PH, POC UA: 7
PROTEIN, POC: NORMAL
SPECIFIC GRAVITY, POC UA: 1.01
UROBILINOGEN, POC UA: 0.2

## 2023-04-13 PROCEDURE — 4010F ACE/ARB THERAPY RXD/TAKEN: CPT | Mod: CPTII,,, | Performed by: NURSE PRACTITIONER

## 2023-04-13 PROCEDURE — 1159F MED LIST DOCD IN RCRD: CPT | Mod: CPTII,,, | Performed by: NURSE PRACTITIONER

## 2023-04-13 PROCEDURE — 3077F SYST BP >= 140 MM HG: CPT | Mod: CPTII,,, | Performed by: NURSE PRACTITIONER

## 2023-04-13 PROCEDURE — 99213 PR OFFICE/OUTPT VISIT, EST, LEVL III, 20-29 MIN: ICD-10-PCS | Mod: 25,,, | Performed by: NURSE PRACTITIONER

## 2023-04-13 PROCEDURE — 81002 POCT URINE DIPSTICK WITHOUT MICROSCOPE: ICD-10-PCS | Mod: ,,, | Performed by: NURSE PRACTITIONER

## 2023-04-13 PROCEDURE — 4010F PR ACE/ARB THEARPY RXD/TAKEN: ICD-10-PCS | Mod: CPTII,,, | Performed by: NURSE PRACTITIONER

## 2023-04-13 PROCEDURE — 99213 OFFICE O/P EST LOW 20 MIN: CPT | Mod: 25,,, | Performed by: NURSE PRACTITIONER

## 2023-04-13 PROCEDURE — 3077F PR MOST RECENT SYSTOLIC BLOOD PRESSURE >= 140 MM HG: ICD-10-PCS | Mod: CPTII,,, | Performed by: NURSE PRACTITIONER

## 2023-04-13 PROCEDURE — 99401 PREV MED CNSL INDIV APPRX 15: CPT | Mod: ,,, | Performed by: NURSE PRACTITIONER

## 2023-04-13 PROCEDURE — 3079F DIAST BP 80-89 MM HG: CPT | Mod: CPTII,,, | Performed by: NURSE PRACTITIONER

## 2023-04-13 PROCEDURE — 3008F PR BODY MASS INDEX (BMI) DOCUMENTED: ICD-10-PCS | Mod: CPTII,,, | Performed by: NURSE PRACTITIONER

## 2023-04-13 PROCEDURE — 1159F PR MEDICATION LIST DOCUMENTED IN MEDICAL RECORD: ICD-10-PCS | Mod: CPTII,,, | Performed by: NURSE PRACTITIONER

## 2023-04-13 PROCEDURE — 3008F BODY MASS INDEX DOCD: CPT | Mod: CPTII,,, | Performed by: NURSE PRACTITIONER

## 2023-04-13 PROCEDURE — 99401 PR PREVENT COUNSEL,INDIV,15 MIN: ICD-10-PCS | Mod: ,,, | Performed by: NURSE PRACTITIONER

## 2023-04-13 PROCEDURE — 81002 URINALYSIS NONAUTO W/O SCOPE: CPT | Mod: ,,, | Performed by: NURSE PRACTITIONER

## 2023-04-13 PROCEDURE — 3079F PR MOST RECENT DIASTOLIC BLOOD PRESSURE 80-89 MM HG: ICD-10-PCS | Mod: CPTII,,, | Performed by: NURSE PRACTITIONER

## 2023-04-13 RX ORDER — LISINOPRIL 5 MG/1
5 TABLET ORAL DAILY
Qty: 90 TABLET | Refills: 3 | Status: SHIPPED | OUTPATIENT
Start: 2023-04-13 | End: 2024-04-12

## 2023-04-13 RX ORDER — ESCITALOPRAM OXALATE 10 MG/1
10 TABLET ORAL DAILY
Qty: 30 TABLET | Refills: 11 | Status: SHIPPED | OUTPATIENT
Start: 2023-04-13 | End: 2023-05-30

## 2023-04-13 RX ORDER — ALPRAZOLAM 0.25 MG/1
0.25 TABLET ORAL 3 TIMES DAILY
Qty: 30 TABLET | Refills: 0 | Status: SHIPPED | OUTPATIENT
Start: 2023-04-13 | End: 2023-05-30

## 2023-04-13 NOTE — PROGRESS NOTES
"Subjective:       Patient ID: Jasmyne Bone is a 62 y.o. female.    Chief Complaint: Results (Lab results)    The patient is a 62-year-old female whose has been dealing with her 's health issues for several years.  She is depressed and crying in the office.  The patient's  is steadily declining and has fallen many times in the home.  I told her we will work on getting him some help. The patient is requesting an antidepressant medication.     The patient's blood pressure is 157/84.  The patient is not on any blood pressure medication and every time she comes to our office her systolic blood pressure is 150 or greater.  We will place her on a low-dose of lisinopril.      Review of Systems 12 point review of systems conducted, negative except as stated in the history of present illness. See HPI for details.        Objective:        Visit Vitals  BP (!) 148/80   Pulse 66   Temp 97.2 °F (36.2 °C) (Temporal)   Resp 20   Ht 5' 1" (1.549 m)   Wt 88.9 kg (196 lb)   BMI 37.03 kg/m²        Physical Exam  Constitutional:       Appearance: She is obese.   HENT:      Head: Normocephalic.      Right Ear: Tympanic membrane normal.      Left Ear: Tympanic membrane normal.      Nose: Nose normal.      Mouth/Throat:      Mouth: Mucous membranes are moist.   Eyes:      Extraocular Movements: Extraocular movements intact.   Cardiovascular:      Rate and Rhythm: Normal rate and regular rhythm.      Heart sounds: No murmur heard.  Pulmonary:      Effort: Pulmonary effort is normal.      Breath sounds: Normal breath sounds.   Abdominal:      General: Bowel sounds are normal.      Palpations: Abdomen is soft.   Musculoskeletal:         General: Normal range of motion.      Cervical back: Normal range of motion and neck supple.   Skin:     General: Skin is warm and dry.   Neurological:      Mental Status: She is alert and oriented to person, place, and time.         Assessment:           ICD-10-CM ICD-9-CM   1. Reactive " depression  F32.9 300.4   2. Anxiety  F41.9 300.00   3. Hypertension, unspecified type  I10 401.9   4. Class 2 obesity with body mass index (BMI) of 37.0 to 37.9 in adult, unspecified obesity type, unspecified whether serious comorbidity present  E66.9 278.00    Z68.37 V85.37   5. Abnormal urinalysis  R82.90 791.9            Plan:       1. Reactive depression  The patient is placed on an SSRI  Return to the clinic in 1 month for re-evaluation   ALPRAZolam (XANAX) 0.25 MG tablet; Take 1 tablet (0.25 mg total) by mouth 3 (three) times daily. for 10 days  Dispense: 30 tablet; Refill: 0    2. Anxiety  The patient is given benzodiazepines for bridging until the Lexapro takes affect  - ALPRAZolam (XANAX) 0.25 MG tablet; Take 1 tablet (0.25 mg total) by mouth 3 (three) times daily. for 10 days  Dispense: 30 tablet; Refill: 0    3. Hypertension, unspecified type  Start new medication  Keep blood pressure log  - lisinopriL (PRINIVIL,ZESTRIL) 5 MG tablet; Take 1 tablet (5 mg total) by mouth once daily.  Dispense: 90 tablet; Refill: 3  - POCT URINE DIPSTICK WITHOUT MICROSCOPE    4. Class 2 obesity with body mass index (BMI) of 37.0 to 37.9 in adult, unspecified obesity type, unspecified whether serious comorbidity present  Obesity  Reduced calorie diet modification  Frequent self weighing discussed  Exercise/lifestyle modification  8 minutes spent counseling    5. Abnormal Urinanalysis  Dip U/A is normal  Patient has no symptoms  Increase water consumption to 4 to 6 (8oz) glasses water daily    Call the office with any questions or concerns        Follow up in about 4 weeks (around 5/11/2023).     No future appointments.     Past Medical History:   Diagnosis Date    Hyperlipidemia     Personal history of colonic polyps 08/22/2018        Review of patient's allergies indicates:  No Known Allergies     Current Outpatient Medications   Medication Instructions    ALPRAZolam (XANAX) 0.25 mg, Oral, 3 times daily    aspirin (ECOTRIN)  81 mg, Oral, Daily    cyanocobalamin (VITAMIN B-12) 1,000 mcg, Oral, Daily    EScitalopram oxalate (LEXAPRO) 10 mg, Oral, Daily    glucosamine-chondroitin 500-400 mg tablet 1 tablet, Oral, 3 times daily    lisinopriL (PRINIVIL,ZESTRIL) 5 mg, Oral, Daily    omeprazole (PRILOSEC) 20 mg, Oral, Daily    turmeric 750 mg, Oral, Daily          Patient Active Problem List   Diagnosis    Dorsalgia, unspecified    Class 2 obesity with body mass index (BMI) of 37.0 to 37.9 in adult    Fatigue    Anxiety    Wellness examination    Reactive depression    Hypertension    Abnormal urinalysis             Past Medical History:   Diagnosis Date    Hyperlipidemia     Personal history of colonic polyps 08/22/2018          Past Surgical History:   Procedure Laterality Date    COLONOSCOPY  08/22/2018    Damien Valverde MD    TONSILLECTOMY             reports that she quit smoking about 43 years ago. Her smoking use included cigarettes. She has never used smokeless tobacco. She reports current alcohol use of about 5.0 - 6.0 standard drinks per week. She reports that she does not use drugs.      There is no immunization history on file for this patient.     Health Maintenance   Topic Date Due    TETANUS VACCINE  Never done    Mammogram  02/08/2022    Lipid Panel  03/16/2028    Hepatitis C Screening  Completed

## 2023-04-13 NOTE — PROGRESS NOTES
"Subjective:       Patient ID: Jasmyne Bone is a 62 y.o. female.    Chief Complaint: Results (Lab results)    HPI    Review of Systems 12 point review of systems conducted, negative except as stated in the history of present illness. See HPI for details.        Objective:        Visit Vitals  BP (!) 157/84   Pulse 64   Temp 97.2 °F (36.2 °C) (Temporal)   Resp 20   Ht 5' 1" (1.549 m)   Wt 88.9 kg (196 lb)   BMI 37.03 kg/m²        Physical Exam      Assessment:         No diagnosis found.         Plan:         There are no diagnoses linked to this encounter.          No follow-ups on file.     No future appointments.     Past Medical History:   Diagnosis Date    Hyperlipidemia     Personal history of colonic polyps 08/22/2018        Review of patient's allergies indicates:  No Known Allergies     Current Outpatient Medications   Medication Instructions    aspirin (ECOTRIN) 81 mg, Oral, Daily    cyanocobalamin (VITAMIN B-12) 1,000 mcg, Oral, Daily    gabapentin (NEURONTIN) 300 mg, Oral, Nightly    glucosamine-chondroitin 500-400 mg tablet 1 tablet, Oral, 3 times daily    omeprazole (PRILOSEC) 20 mg, Oral, Daily    turmeric 750 mg, Oral, Daily          Patient Active Problem List   Diagnosis    Dorsalgia, unspecified    Class 2 obesity with body mass index (BMI) of 37.0 to 37.9 in adult    Fatigue    Anxiety    Wellness examination             Past Medical History:   Diagnosis Date    Hyperlipidemia     Personal history of colonic polyps 08/22/2018          Past Surgical History:   Procedure Laterality Date    COLONOSCOPY  08/22/2018    Damien Valverde MD    TONSILLECTOMY             reports that she quit smoking about 43 years ago. Her smoking use included cigarettes. She has never used smokeless tobacco. She reports current alcohol use of about 5.0 - 6.0 standard drinks per week. She reports that she does not use drugs.      There is no immunization history on file for this patient.     Health Maintenance   Topic " Date Due    TETANUS VACCINE  Never done    Mammogram  02/08/2022    Lipid Panel  03/16/2028    Hepatitis C Screening  Completed

## 2023-05-26 ENCOUNTER — DOCUMENTATION ONLY (OUTPATIENT)
Dept: ADMINISTRATIVE | Facility: HOSPITAL | Age: 63
End: 2023-05-26
Payer: MEDICAID

## 2023-05-26 NOTE — PROGRESS NOTES
Requested mammogram from [facility].  Juliette Bashir MA - Panel Care Coordinator

## 2023-05-26 NOTE — LETTER
AUTHORIZATION FOR RELEASE OF   CONFIDENTIAL INFORMATION    Dear JARRED,    We are seeing Jasmyne Bone, date of birth 1960, in the clinic at [facility]. Cassia Lugo NP is the patient's PCP. Jasmyne Bone has an outstanding lab/procedure at the time we reviewed her chart. In order to help keep her health information updated, she has authorized us to request the following medical record(s):        ( x )  MAMMOGRAM                                      (  )  COLONOSCOPY      (  )  PAP SMEAR                                          (  )  OUTSIDE LAB RESULTS     (  )  DEXA SCAN                                          (  )  EYE EXAM            (  )  FOOT EXAM                                          (  )  ENTIRE RECORD     (  )  OUTSIDE IMMUNIZATIONS                 (  )  _______________         Please fax records to [facility], Helen Love, NP, [phone].           Patient Name: Jasmyne Bone  : 1960  Patient Phone #: [phone]      77 year old female with PMH including HLD, GERD, Anxiety, Anemia, CAD s/p HERBERT, severe AS (s/p TAVR & PPM) h/o MVR, PMR on chronic steroids, asthma, OA, s/p TKR with recent joint infection s/p explant and abx spacer on 12/23/17,  RLE DVT on Coumadin s/p right knee explantation of previously placed articulating antibiotic spacer, irrigation and debridement of the knee, placement of static antibiotic spacer, with a Plastic Surgery soft tissue complex wound closure presented from rehab with fever with complaints of pain.  Currently receiving po dilaudid 2mg prn and requiring multiple doses of IVP Dilaudid as pain uncontrolled.

## 2023-05-30 ENCOUNTER — OFFICE VISIT (OUTPATIENT)
Dept: FAMILY MEDICINE | Facility: CLINIC | Age: 63
End: 2023-05-30
Payer: MEDICAID

## 2023-05-30 ENCOUNTER — PATIENT MESSAGE (OUTPATIENT)
Dept: ADMINISTRATIVE | Facility: HOSPITAL | Age: 63
End: 2023-05-30
Payer: MEDICAID

## 2023-05-30 VITALS
WEIGHT: 193 LBS | TEMPERATURE: 97 F | HEIGHT: 61 IN | BODY MASS INDEX: 36.44 KG/M2 | HEART RATE: 53 BPM | DIASTOLIC BLOOD PRESSURE: 74 MMHG | SYSTOLIC BLOOD PRESSURE: 135 MMHG | RESPIRATION RATE: 20 BRPM

## 2023-05-30 DIAGNOSIS — E66.01 CLASS 2 SEVERE OBESITY DUE TO EXCESS CALORIES WITH SERIOUS COMORBIDITY IN ADULT, UNSPECIFIED BMI: ICD-10-CM

## 2023-05-30 DIAGNOSIS — I10 HYPERTENSION, UNSPECIFIED TYPE: ICD-10-CM

## 2023-05-30 DIAGNOSIS — R00.1 BRADYCARDIA: ICD-10-CM

## 2023-05-30 DIAGNOSIS — F32.A DEPRESSION, UNSPECIFIED DEPRESSION TYPE: ICD-10-CM

## 2023-05-30 DIAGNOSIS — Z12.31 OTHER SCREENING MAMMOGRAM: ICD-10-CM

## 2023-05-30 PROCEDURE — 4010F ACE/ARB THERAPY RXD/TAKEN: CPT | Mod: CPTII,,, | Performed by: NURSE PRACTITIONER

## 2023-05-30 PROCEDURE — 99213 PR OFFICE/OUTPT VISIT, EST, LEVL III, 20-29 MIN: ICD-10-PCS | Mod: ,,, | Performed by: NURSE PRACTITIONER

## 2023-05-30 PROCEDURE — 3008F PR BODY MASS INDEX (BMI) DOCUMENTED: ICD-10-PCS | Mod: CPTII,,, | Performed by: NURSE PRACTITIONER

## 2023-05-30 PROCEDURE — 1159F MED LIST DOCD IN RCRD: CPT | Mod: CPTII,,, | Performed by: NURSE PRACTITIONER

## 2023-05-30 PROCEDURE — 3075F SYST BP GE 130 - 139MM HG: CPT | Mod: CPTII,,, | Performed by: NURSE PRACTITIONER

## 2023-05-30 PROCEDURE — 3075F PR MOST RECENT SYSTOLIC BLOOD PRESS GE 130-139MM HG: ICD-10-PCS | Mod: CPTII,,, | Performed by: NURSE PRACTITIONER

## 2023-05-30 PROCEDURE — 3078F DIAST BP <80 MM HG: CPT | Mod: CPTII,,, | Performed by: NURSE PRACTITIONER

## 2023-05-30 PROCEDURE — 99213 OFFICE O/P EST LOW 20 MIN: CPT | Mod: ,,, | Performed by: NURSE PRACTITIONER

## 2023-05-30 PROCEDURE — 4010F PR ACE/ARB THEARPY RXD/TAKEN: ICD-10-PCS | Mod: CPTII,,, | Performed by: NURSE PRACTITIONER

## 2023-05-30 PROCEDURE — 3008F BODY MASS INDEX DOCD: CPT | Mod: CPTII,,, | Performed by: NURSE PRACTITIONER

## 2023-05-30 PROCEDURE — 1159F PR MEDICATION LIST DOCUMENTED IN MEDICAL RECORD: ICD-10-PCS | Mod: CPTII,,, | Performed by: NURSE PRACTITIONER

## 2023-05-30 PROCEDURE — 3078F PR MOST RECENT DIASTOLIC BLOOD PRESSURE < 80 MM HG: ICD-10-PCS | Mod: CPTII,,, | Performed by: NURSE PRACTITIONER

## 2023-05-30 RX ORDER — ESCITALOPRAM OXALATE 20 MG/1
20 TABLET ORAL DAILY
Qty: 30 TABLET | Refills: 11 | Status: SHIPPED | OUTPATIENT
Start: 2023-05-30 | End: 2024-05-29

## 2023-05-30 NOTE — PROGRESS NOTES
"Subjective:       Patient ID: Jasmyne Bone is a 62 y.o. female.    Chief Complaint: Anxiety (1 month follow up) and Hypertension (1 month follow up)      The patient is a 62 year old female with a BMI of 36.47.  The patient has a history of hypertension, depression and anxiety.  The patient has been on Lexapro 10 mg since April 13th.  The patient states she thought it was working great for the 1st couple of weeks but has noticed that lately she is had times when she is overwhelmed.    Patient has a low heart rate of 53.  I double-checked this with the pulse oximeter and it was 52.  Patient admits to fatigue.  I told her to call the cardiologist that she saw for hyperlipidemia.  She states she will do that.    The patient is due for mammogram.    Review of [id number] point review of systems conducted, negative except as stated in the history of present illness. See HPI for details.      Objective:      Visit Vitals  /74   Pulse (!) 53   Temp 97.2 °F (36.2 °C) (Temporal)   Resp 20   Ht 5' 1" (1.549 m)   Wt 87.5 kg (193 lb)   BMI 36.47 kg/m²     Physical Exam  Constitutional:       Appearance: She is obese.   HENT:      Head: Normocephalic.      Right Ear: Tympanic membrane normal.      Left Ear: Tympanic membrane normal.      Nose: Nose normal.   Eyes:      Extraocular Movements: Extraocular movements intact.      Conjunctiva/sclera: Conjunctivae normal.   Cardiovascular:      Rate and Rhythm: Regular rhythm. Bradycardia present.      Heart sounds: No murmur heard.  Pulmonary:      Effort: Pulmonary effort is normal.      Breath sounds: Normal breath sounds.   Abdominal:      General: Bowel sounds are normal.      Palpations: Abdomen is soft.   Musculoskeletal:         General: Normal range of motion.      Cervical back: Normal range of motion and neck supple.   Skin:     General: Skin is warm and dry.   Neurological:      Mental Status: She is alert and oriented to person, place, and time.     Current " Outpatient Medications   Medication Instructions    ALPRAZolam (XANAX) 0.25 mg, Oral, 3 times daily    aspirin (ECOTRIN) 81 mg, Oral, Daily    cyanocobalamin (VITAMIN B-12) 1,000 mcg, Oral, Daily    EScitalopram oxalate (LEXAPRO) 20 mg, Oral, Daily    glucosamine-chondroitin 500-400 mg tablet 1 tablet, Oral, 3 times daily    lisinopriL (PRINIVIL,ZESTRIL) 5 mg, Oral, Daily    omeprazole (PRILOSEC) 20 mg, Oral, Daily    turmeric 750 mg, Oral, Daily     has No Known Allergies.       Assessment:         ICD-10-CM ICD-9-CM   1. Hypertension, unspecified type  I10 401.9   2. Bradycardia  R00.1 427.89   3. Depression, unspecified depression type  F32.A 311   4. Other screening mammogram  Z12.31 V76.12   5. Class 2 severe obesity due to excess calories with serious comorbidity in adult, unspecified BMI  E66.01 278.01          Plan:       1. Hypertension, unspecified type  Hypertension  Controlled  Continue current medication  Return to clinic with any concerns    2. Bradycardia  Patient to call Dr. Gomez's office    3. Depression, unspecified depression type  Will increase patient's medication  - EScitalopram oxalate (LEXAPRO) 20 MG tablet; Take 1 tablet (20 mg total) by mouth once daily.  Dispense: 30 tablet; Refill: 11    4. Other screening mammogram  - Mammo Digital Screening Bilat; Future    5. Class 2 severe obesity due to excess calories with serious comorbidity in adult, unspecified BMI  Obesity  Reduced calorie diet modification  Frequent self weighing discussed  Exercise/lifestyle modification  Addressed        Follow up in about 6 months (around 11/30/2023), or if symptoms worsen or fail to improve.     Future Appointments   Date Time Provider Department Center   11/21/2023  8:00 AM NURSE, [facility] [place]   11/30/2023 10:00 AM Cassia Lugo NP [facility] [place]

## 2023-06-05 PROBLEM — Z00.00 WELLNESS EXAMINATION: Status: RESOLVED | Noted: 2023-03-01 | Resolved: 2023-06-05

## 2023-06-07 ENCOUNTER — HOSPITAL ENCOUNTER (OUTPATIENT)
Dept: RADIOLOGY | Facility: HOSPITAL | Age: 63
Discharge: HOME | End: 2023-06-07
Attending: NURSE PRACTITIONER
Payer: MEDICAID

## 2023-06-07 VITALS — HEIGHT: 61 IN | WEIGHT: 193 LBS | BODY MASS INDEX: 36.44 KG/M2

## 2023-06-07 DIAGNOSIS — Z12.31 OTHER SCREENING MAMMOGRAM: ICD-10-CM

## 2023-06-07 PROCEDURE — 77067 SCR MAMMO BI INCL CAD: CPT | Mod: TC

## 2023-07-28 ENCOUNTER — LAB VISIT (OUTPATIENT)
Dept: LAB | Facility: HOSPITAL | Age: 63
End: 2023-07-28
Attending: INTERNAL MEDICINE
Payer: MEDICAID

## 2023-07-28 DIAGNOSIS — E78.5 HYPERLIPIDEMIA, UNSPECIFIED HYPERLIPIDEMIA TYPE: Primary | ICD-10-CM

## 2023-07-28 LAB
CHOLEST SERPL-MCNC: 160 MG/DL (ref 0–200)
HDLC SERPL-MCNC: 58 MG/DL (ref 40–60)
LDLC SERPL DIRECT ASSAY-SCNC: 71 MG/DL (ref 30–100)
TRIGL SERPL-MCNC: 111 MG/DL (ref 30–200)

## 2023-07-28 PROCEDURE — 36415 COLL VENOUS BLD VENIPUNCTURE: CPT

## 2023-07-28 PROCEDURE — 80061 LIPID PANEL: CPT

## 2023-10-13 ENCOUNTER — OFFICE VISIT (OUTPATIENT)
Dept: FAMILY MEDICINE | Facility: CLINIC | Age: 63
End: 2023-10-13
Payer: MEDICAID

## 2023-10-13 VITALS
DIASTOLIC BLOOD PRESSURE: 73 MMHG | RESPIRATION RATE: 20 BRPM | SYSTOLIC BLOOD PRESSURE: 123 MMHG | HEART RATE: 62 BPM | HEIGHT: 61 IN | TEMPERATURE: 98 F | WEIGHT: 190 LBS | BODY MASS INDEX: 35.87 KG/M2

## 2023-10-13 DIAGNOSIS — R30.0 DYSURIA: ICD-10-CM

## 2023-10-13 DIAGNOSIS — M54.42 CHRONIC BILATERAL LOW BACK PAIN WITH BILATERAL SCIATICA: ICD-10-CM

## 2023-10-13 DIAGNOSIS — G89.29 CHRONIC BILATERAL LOW BACK PAIN WITH BILATERAL SCIATICA: ICD-10-CM

## 2023-10-13 DIAGNOSIS — N95.2 ATROPHIC VAGINITIS: ICD-10-CM

## 2023-10-13 DIAGNOSIS — M54.41 CHRONIC BILATERAL LOW BACK PAIN WITH BILATERAL SCIATICA: ICD-10-CM

## 2023-10-13 DIAGNOSIS — H81.13 BENIGN PAROXYSMAL POSITIONAL VERTIGO DUE TO BILATERAL VESTIBULAR DISORDER: ICD-10-CM

## 2023-10-13 PROBLEM — R42 VERTIGO: Status: ACTIVE | Noted: 2023-10-13

## 2023-10-13 LAB
BILIRUB SERPL-MCNC: NORMAL MG/DL
BLOOD URINE, POC: NORMAL
CLARITY, POC UA: CLEAR
COLOR, POC UA: YELLOW
GLUCOSE UR QL STRIP: NORMAL
KETONES UR QL STRIP: NORMAL
LEUKOCYTE ESTERASE URINE, POC: NORMAL
NITRITE, POC UA: NORMAL
PH, POC UA: 5
PROTEIN, POC: NORMAL
SPECIFIC GRAVITY, POC UA: 1.02
UROBILINOGEN, POC UA: 0.2

## 2023-10-13 PROCEDURE — 3044F HG A1C LEVEL LT 7.0%: CPT | Mod: CPTII,,, | Performed by: NURSE PRACTITIONER

## 2023-10-13 PROCEDURE — 1159F PR MEDICATION LIST DOCUMENTED IN MEDICAL RECORD: ICD-10-PCS | Mod: CPTII,,, | Performed by: NURSE PRACTITIONER

## 2023-10-13 PROCEDURE — 3008F BODY MASS INDEX DOCD: CPT | Mod: CPTII,,, | Performed by: NURSE PRACTITIONER

## 2023-10-13 PROCEDURE — 3078F PR MOST RECENT DIASTOLIC BLOOD PRESSURE < 80 MM HG: ICD-10-PCS | Mod: CPTII,,, | Performed by: NURSE PRACTITIONER

## 2023-10-13 PROCEDURE — 3078F DIAST BP <80 MM HG: CPT | Mod: CPTII,,, | Performed by: NURSE PRACTITIONER

## 2023-10-13 PROCEDURE — 81002 URINALYSIS NONAUTO W/O SCOPE: CPT | Mod: ,,, | Performed by: NURSE PRACTITIONER

## 2023-10-13 PROCEDURE — 3074F SYST BP LT 130 MM HG: CPT | Mod: CPTII,,, | Performed by: NURSE PRACTITIONER

## 2023-10-13 PROCEDURE — 4010F ACE/ARB THERAPY RXD/TAKEN: CPT | Mod: CPTII,,, | Performed by: NURSE PRACTITIONER

## 2023-10-13 PROCEDURE — 99213 OFFICE O/P EST LOW 20 MIN: CPT | Mod: ,,, | Performed by: NURSE PRACTITIONER

## 2023-10-13 PROCEDURE — 1159F MED LIST DOCD IN RCRD: CPT | Mod: CPTII,,, | Performed by: NURSE PRACTITIONER

## 2023-10-13 PROCEDURE — 3074F PR MOST RECENT SYSTOLIC BLOOD PRESSURE < 130 MM HG: ICD-10-PCS | Mod: CPTII,,, | Performed by: NURSE PRACTITIONER

## 2023-10-13 PROCEDURE — 3044F PR MOST RECENT HEMOGLOBIN A1C LEVEL <7.0%: ICD-10-PCS | Mod: CPTII,,, | Performed by: NURSE PRACTITIONER

## 2023-10-13 PROCEDURE — 99213 PR OFFICE/OUTPT VISIT, EST, LEVL III, 20-29 MIN: ICD-10-PCS | Mod: ,,, | Performed by: NURSE PRACTITIONER

## 2023-10-13 PROCEDURE — 4010F PR ACE/ARB THEARPY RXD/TAKEN: ICD-10-PCS | Mod: CPTII,,, | Performed by: NURSE PRACTITIONER

## 2023-10-13 PROCEDURE — 3008F PR BODY MASS INDEX (BMI) DOCUMENTED: ICD-10-PCS | Mod: CPTII,,, | Performed by: NURSE PRACTITIONER

## 2023-10-13 PROCEDURE — 81002 POCT URINE DIPSTICK WITHOUT MICROSCOPE: ICD-10-PCS | Mod: ,,, | Performed by: NURSE PRACTITIONER

## 2023-10-13 RX ORDER — PRAVASTATIN SODIUM 40 MG/1
1 TABLET ORAL DAILY
COMMUNITY
Start: 2023-10-11

## 2023-10-13 RX ORDER — GABAPENTIN 300 MG/1
300 CAPSULE ORAL NIGHTLY
Qty: 90 CAPSULE | Refills: 2 | Status: SHIPPED | OUTPATIENT
Start: 2023-10-13 | End: 2024-07-09

## 2023-10-13 RX ORDER — ESTRADIOL 0.1 MG/G
1 CREAM VAGINAL DAILY
Qty: 30 G | Refills: 11 | Status: SHIPPED | OUTPATIENT
Start: 2023-10-13 | End: 2024-10-12

## 2023-10-13 RX ORDER — MECLIZINE HYDROCHLORIDE 25 MG/1
25 TABLET ORAL 3 TIMES DAILY PRN
Qty: 90 TABLET | Refills: 1 | Status: SHIPPED | OUTPATIENT
Start: 2023-10-13 | End: 2023-12-12

## 2023-10-13 NOTE — PROGRESS NOTES
"Subjective:       Patient ID: Jasmyne Bone is a 62 y.o. female.    Chief Complaint: Dizziness (Dizziness X's 1 week) and Dysuria (Dysuria X's 10 days)      The patient presents complaining of dizziness and vertigo.  Patient states it is more like the room is moving around.  She states she has noticed when she was her head a certain way it potentiates vertigo.  Suspect benign paroxysmal positional vertigo.    The patient is also complaining of burning on urination.  Suspect atrophic vaginitis  .  The patient is complaining of low back pain that is accompanied by bilateral sciatica.  We will x-ray    Back Pain  This is a chronic problem. The current episode started more than 1 year ago. The problem has been gradually worsening since onset. The pain is present in the lumbar spine. The quality of the pain is described as aching. The pain radiates to the right foot and left foot. The pain is at a severity of 8/10. The pain is severe. The pain is Worse during the day. The symptoms are aggravated by position, standing and sitting. Stiffness is present In the morning. Associated symptoms include leg pain, numbness, paresis and tingling. Risk factors include lack of exercise, menopause, obesity and sedentary lifestyle.     Review of Systems   Musculoskeletal:  Positive for back pain and leg pain.   Neurological:  Positive for tingling and numbness.   12 point review of systems conducted, negative except as stated in the history of present illness. See HPI for details.      Objective:      Visit Vitals  /73   Pulse 62   Temp 98.4 °F (36.9 °C)   Resp 20   Ht 5' 1" (1.549 m)   Wt 86.2 kg (190 lb)   BMI 35.90 kg/m²     Physical Exam  Vitals and nursing note reviewed.   Constitutional:       Appearance: She is obese.   HENT:      Head: Normocephalic.      Right Ear: Tympanic membrane normal.      Left Ear: Tympanic membrane normal.      Nose: Nose normal.   Eyes:      Extraocular Movements: Extraocular movements " intact.   Cardiovascular:      Rate and Rhythm: Normal rate and regular rhythm.      Heart sounds: No murmur heard.  Pulmonary:      Effort: Pulmonary effort is normal.      Breath sounds: Normal breath sounds.   Abdominal:      General: Bowel sounds are normal.      Palpations: Abdomen is soft.   Musculoskeletal:      Cervical back: Normal range of motion and neck supple.      Comments: Limited range of motion low back due to pain   Skin:     General: Skin is warm and dry.   Neurological:      Mental Status: She is alert and oriented to person, place, and time.       Current Outpatient Medications   Medication Instructions    aspirin (ECOTRIN) 81 mg, Oral, Daily    cyanocobalamin (VITAMIN B-12) 1,000 mcg, Oral, Daily    EScitalopram oxalate (LEXAPRO) 20 mg, Oral, Daily    estradioL (ESTRACE) 1 g, Vaginal, Daily    gabapentin (NEURONTIN) 300 mg, Oral, Nightly    glucosamine-chondroitin 500-400 mg tablet 1 tablet, Oral, 3 times daily    lisinopriL (PRINIVIL,ZESTRIL) 5 mg, Oral, Daily    meclizine (ANTIVERT) 25 mg, Oral, 3 times daily PRN    omeprazole (PRILOSEC) 20 mg, Oral, Daily    pravastatin (PRAVACHOL) 40 MG tablet 1 tablet, Oral, Daily    turmeric 750 mg, Oral, Daily     has No Known Allergies.       Assessment:         ICD-10-CM ICD-9-CM   1. Atrophic vaginitis  N95.2 627.3   2. Dysuria  R30.0 788.1   3. Benign paroxysmal positional vertigo due to bilateral vestibular disorder  H81.13 386.11   4. Chronic bilateral low back pain with bilateral sciatica  M54.42 724.2    M54.41 724.3    G89.29 338.29          Plan:         1. Atrophic vaginitis  - estradioL (ESTRACE) 0.01 % (0.1 mg/gram) vaginal cream; Place 1 g vaginally 3 times weekly  Dispense: 30 g; Refill: 11    2. Dysuria  - POCT URINE DIPSTICK WITHOUT MICROSCOPE - negative    3. Benign paroxysmal positional vertigo due to bilateral vestibular disorder  Epley maneuver exercises printed off given the patient  Meclizine 25 mg t.i.d. p.r.n. dizziness    4.  Chronic bilateral low back pain with bilateral sciatica  - X-Ray Lumbar Spine 2 Or 3 Views; Future  - gabapentin (NEURONTIN) 300 MG capsule; Take 1 capsule (300 mg total) by mouth every evening.  Dispense: 90 capsule; Refill: 2    Follow up if symptoms worsen or fail to improve.     Future Appointments   Date Time Provider Department Center   11/21/2023  8:00 AM NURSE, [facility] [place]   11/30/2023 10:00 [facility] [place]

## 2023-11-01 ENCOUNTER — HOSPITAL ENCOUNTER (OUTPATIENT)
Dept: RADIOLOGY | Facility: HOSPITAL | Age: 63
Discharge: HOME | End: 2023-11-01
Attending: NURSE PRACTITIONER
Payer: MEDICARE

## 2023-11-01 DIAGNOSIS — M54.41 CHRONIC BILATERAL LOW BACK PAIN WITH BILATERAL SCIATICA: ICD-10-CM

## 2023-11-01 DIAGNOSIS — M54.42 CHRONIC BILATERAL LOW BACK PAIN WITH BILATERAL SCIATICA: ICD-10-CM

## 2023-11-01 DIAGNOSIS — G89.29 CHRONIC BILATERAL LOW BACK PAIN WITH BILATERAL SCIATICA: ICD-10-CM

## 2023-11-01 PROCEDURE — 72100 X-RAY EXAM L-S SPINE 2/3 VWS: CPT | Mod: TC

## 2023-11-17 ENCOUNTER — TELEPHONE (OUTPATIENT)
Dept: FAMILY MEDICINE | Facility: CLINIC | Age: 63
End: 2023-11-17
Payer: MEDICARE

## 2023-11-17 NOTE — TELEPHONE ENCOUNTER
I called the patient and spoke with her over the phone about her x-ray of the lumbar spine.  The x-ray shows that she has moderate to severe multilevel lumbar spondylosis.  I explained to her that that is generally degenerative disease and arthritis.  I told her the best thing to do is physical therapy and she will call me later to discuss.

## 2023-11-20 DIAGNOSIS — I10 HYPERTENSION, UNSPECIFIED TYPE: Primary | ICD-10-CM

## 2023-11-20 DIAGNOSIS — F32.A DEPRESSION, UNSPECIFIED DEPRESSION TYPE: ICD-10-CM

## 2023-11-20 DIAGNOSIS — R00.1 BRADYCARDIA: ICD-10-CM

## 2023-11-20 DIAGNOSIS — E66.01 CLASS 2 SEVERE OBESITY DUE TO EXCESS CALORIES WITH SERIOUS COMORBIDITY IN ADULT, UNSPECIFIED BMI: ICD-10-CM

## 2024-03-25 ENCOUNTER — OFFICE VISIT (OUTPATIENT)
Dept: FAMILY MEDICINE | Facility: CLINIC | Age: 64
End: 2024-03-25
Payer: MEDICARE

## 2024-03-25 VITALS
DIASTOLIC BLOOD PRESSURE: 74 MMHG | SYSTOLIC BLOOD PRESSURE: 125 MMHG | HEIGHT: 61 IN | TEMPERATURE: 97 F | RESPIRATION RATE: 20 BRPM | WEIGHT: 202 LBS | BODY MASS INDEX: 38.14 KG/M2 | HEART RATE: 79 BPM

## 2024-03-25 DIAGNOSIS — R05.9 COUGH, UNSPECIFIED TYPE: ICD-10-CM

## 2024-03-25 DIAGNOSIS — R06.2 WHEEZING: ICD-10-CM

## 2024-03-25 DIAGNOSIS — R50.9 FEVER, UNSPECIFIED FEVER CAUSE: ICD-10-CM

## 2024-03-25 DIAGNOSIS — J01.40 ACUTE PANSINUSITIS, RECURRENCE NOT SPECIFIED: ICD-10-CM

## 2024-03-25 LAB
CTP QC/QA: YES
CTP QC/QA: YES
POC MOLECULAR INFLUENZA A AGN: NEGATIVE
POC MOLECULAR INFLUENZA B AGN: NEGATIVE
SARS-COV-2 AG RESP QL IA.RAPID: NEGATIVE

## 2024-03-25 PROCEDURE — 96372 THER/PROPH/DIAG INJ SC/IM: CPT | Mod: ,,, | Performed by: NURSE PRACTITIONER

## 2024-03-25 PROCEDURE — 3008F BODY MASS INDEX DOCD: CPT | Mod: ,,, | Performed by: NURSE PRACTITIONER

## 2024-03-25 PROCEDURE — 99214 OFFICE O/P EST MOD 30 MIN: CPT | Mod: 25,,, | Performed by: NURSE PRACTITIONER

## 2024-03-25 PROCEDURE — 3074F SYST BP LT 130 MM HG: CPT | Mod: ,,, | Performed by: NURSE PRACTITIONER

## 2024-03-25 PROCEDURE — 1159F MED LIST DOCD IN RCRD: CPT | Mod: ,,, | Performed by: NURSE PRACTITIONER

## 2024-03-25 PROCEDURE — 87811 SARS-COV-2 COVID19 W/OPTIC: CPT | Mod: QW,RHCUB | Performed by: NURSE PRACTITIONER

## 2024-03-25 PROCEDURE — 3078F DIAST BP <80 MM HG: CPT | Mod: ,,, | Performed by: NURSE PRACTITIONER

## 2024-03-25 PROCEDURE — 87502 INFLUENZA DNA AMP PROBE: CPT | Mod: QW,RHCUB | Performed by: NURSE PRACTITIONER

## 2024-03-25 PROCEDURE — 4010F ACE/ARB THERAPY RXD/TAKEN: CPT | Mod: ,,, | Performed by: NURSE PRACTITIONER

## 2024-03-25 RX ORDER — MONTELUKAST SODIUM 10 MG/1
10 TABLET ORAL NIGHTLY
Qty: 30 TABLET | Refills: 6 | Status: SHIPPED | OUTPATIENT
Start: 2024-03-25 | End: 2024-04-17 | Stop reason: SDUPTHER

## 2024-03-25 RX ORDER — METHYLPREDNISOLONE ACETATE 40 MG/ML
40 INJECTION, SUSPENSION INTRA-ARTICULAR; INTRALESIONAL; INTRAMUSCULAR; SOFT TISSUE
Status: COMPLETED | OUTPATIENT
Start: 2024-03-25 | End: 2024-03-25

## 2024-03-25 RX ORDER — AZELASTINE 1 MG/ML
1 SPRAY, METERED NASAL 2 TIMES DAILY
Qty: 3 ML | Refills: 6 | Status: SHIPPED | OUTPATIENT
Start: 2024-03-25 | End: 2025-03-25

## 2024-03-25 RX ORDER — AZITHROMYCIN 250 MG/1
TABLET, FILM COATED ORAL
Qty: 6 TABLET | Refills: 0 | Status: SHIPPED | OUTPATIENT
Start: 2024-03-25 | End: 2024-03-30

## 2024-03-25 RX ADMIN — METHYLPREDNISOLONE ACETATE 40 MG: 40 INJECTION, SUSPENSION INTRA-ARTICULAR; INTRALESIONAL; INTRAMUSCULAR; SOFT TISSUE at 10:03

## 2024-04-17 DIAGNOSIS — G89.29 CHRONIC BILATERAL LOW BACK PAIN WITH BILATERAL SCIATICA: ICD-10-CM

## 2024-04-17 DIAGNOSIS — J01.40 ACUTE PANSINUSITIS, RECURRENCE NOT SPECIFIED: ICD-10-CM

## 2024-04-17 DIAGNOSIS — M54.42 CHRONIC BILATERAL LOW BACK PAIN WITH BILATERAL SCIATICA: ICD-10-CM

## 2024-04-17 DIAGNOSIS — M54.41 CHRONIC BILATERAL LOW BACK PAIN WITH BILATERAL SCIATICA: ICD-10-CM

## 2024-04-17 DIAGNOSIS — F32.A DEPRESSION, UNSPECIFIED DEPRESSION TYPE: ICD-10-CM

## 2024-04-17 DIAGNOSIS — I10 HYPERTENSION, UNSPECIFIED TYPE: ICD-10-CM

## 2024-04-17 RX ORDER — GABAPENTIN 300 MG/1
300 CAPSULE ORAL NIGHTLY
Qty: 90 CAPSULE | Refills: 1 | Status: SHIPPED | OUTPATIENT
Start: 2024-04-17 | End: 2024-06-13

## 2024-04-17 RX ORDER — LISINOPRIL 5 MG/1
5 TABLET ORAL DAILY
Qty: 90 TABLET | Refills: 1 | Status: SHIPPED | OUTPATIENT
Start: 2024-04-17 | End: 2024-10-14

## 2024-04-17 RX ORDER — MONTELUKAST SODIUM 10 MG/1
10 TABLET ORAL NIGHTLY
Qty: 90 TABLET | Refills: 1 | Status: SHIPPED | OUTPATIENT
Start: 2024-04-17 | End: 2024-10-14

## 2024-04-17 RX ORDER — ESCITALOPRAM OXALATE 20 MG/1
20 TABLET ORAL DAILY
Qty: 90 TABLET | Refills: 1 | Status: SHIPPED | OUTPATIENT
Start: 2024-04-17 | End: 2024-10-14

## 2024-06-13 DIAGNOSIS — M54.42 CHRONIC BILATERAL LOW BACK PAIN WITH BILATERAL SCIATICA: ICD-10-CM

## 2024-06-13 DIAGNOSIS — M54.41 CHRONIC BILATERAL LOW BACK PAIN WITH BILATERAL SCIATICA: ICD-10-CM

## 2024-06-13 DIAGNOSIS — G89.29 CHRONIC BILATERAL LOW BACK PAIN WITH BILATERAL SCIATICA: ICD-10-CM

## 2024-06-13 RX ORDER — GABAPENTIN 300 MG/1
CAPSULE ORAL
Qty: 90 CAPSULE | Refills: 1 | Status: SHIPPED | OUTPATIENT
Start: 2024-06-13

## 2024-06-14 ENCOUNTER — CLINICAL SUPPORT (OUTPATIENT)
Dept: FAMILY MEDICINE | Facility: CLINIC | Age: 64
End: 2024-06-14
Payer: MEDICARE

## 2024-06-14 DIAGNOSIS — F32.A DEPRESSION, UNSPECIFIED DEPRESSION TYPE: ICD-10-CM

## 2024-06-14 DIAGNOSIS — E66.01 CLASS 2 SEVERE OBESITY DUE TO EXCESS CALORIES WITH SERIOUS COMORBIDITY IN ADULT, UNSPECIFIED BMI: ICD-10-CM

## 2024-06-14 DIAGNOSIS — I10 HYPERTENSION, UNSPECIFIED TYPE: ICD-10-CM

## 2024-06-14 DIAGNOSIS — R00.1 BRADYCARDIA: ICD-10-CM

## 2024-06-14 LAB
ALBUMIN SERPL-MCNC: 3.8 G/DL (ref 3.4–4.8)
ALBUMIN/GLOB SERPL: 1.2 RATIO (ref 1.1–2)
ALP SERPL-CCNC: 81 UNIT/L (ref 40–150)
ALT SERPL-CCNC: 18 UNIT/L (ref 0–55)
ANION GAP SERPL CALC-SCNC: 6 MEQ/L
AST SERPL-CCNC: 18 UNIT/L (ref 5–34)
BASOPHILS # BLD AUTO: 0.03 X10(3)/MCL
BASOPHILS NFR BLD AUTO: 0.6 %
BILIRUB SERPL-MCNC: 0.4 MG/DL
BILIRUB UR QL STRIP.AUTO: NEGATIVE
BUN SERPL-MCNC: 14 MG/DL (ref 9.8–20.1)
CALCIUM SERPL-MCNC: 9.4 MG/DL (ref 8.4–10.2)
CHLORIDE SERPL-SCNC: 106 MMOL/L (ref 98–107)
CHOLEST SERPL-MCNC: 152 MG/DL
CHOLEST/HDLC SERPL: 3 {RATIO} (ref 0–5)
CLARITY UR: CLEAR
CO2 SERPL-SCNC: 28 MMOL/L (ref 23–31)
COLOR UR AUTO: YELLOW
CREAT SERPL-MCNC: 0.81 MG/DL (ref 0.55–1.02)
CREAT/UREA NIT SERPL: 17
EOSINOPHIL # BLD AUTO: 0.15 X10(3)/MCL (ref 0–0.9)
EOSINOPHIL NFR BLD AUTO: 2.8 %
ERYTHROCYTE [DISTWIDTH] IN BLOOD BY AUTOMATED COUNT: 13.4 % (ref 11.5–17)
GFR SERPLBLD CREATININE-BSD FMLA CKD-EPI: >60 ML/MIN/1.73/M2
GLOBULIN SER-MCNC: 3.3 GM/DL (ref 2.4–3.5)
GLUCOSE SERPL-MCNC: 100 MG/DL (ref 82–115)
GLUCOSE UR QL STRIP: NEGATIVE
HCT VFR BLD AUTO: 39.9 % (ref 37–47)
HDLC SERPL-MCNC: 46 MG/DL (ref 35–60)
HGB BLD-MCNC: 12.7 G/DL (ref 12–16)
HGB UR QL STRIP: NEGATIVE
IMM GRANULOCYTES # BLD AUTO: 0.01 X10(3)/MCL (ref 0–0.04)
IMM GRANULOCYTES NFR BLD AUTO: 0.2 %
KETONES UR QL STRIP: NEGATIVE
LDLC SERPL CALC-MCNC: 84 MG/DL (ref 50–140)
LEUKOCYTE ESTERASE UR QL STRIP: NEGATIVE
LYMPHOCYTES # BLD AUTO: 1.19 X10(3)/MCL (ref 0.6–4.6)
LYMPHOCYTES NFR BLD AUTO: 22 %
MCH RBC QN AUTO: 28 PG (ref 27–31)
MCHC RBC AUTO-ENTMCNC: 31.8 G/DL (ref 33–36)
MCV RBC AUTO: 87.9 FL (ref 80–94)
MONOCYTES # BLD AUTO: 0.42 X10(3)/MCL (ref 0.1–1.3)
MONOCYTES NFR BLD AUTO: 7.8 %
NEUTROPHILS # BLD AUTO: 3.6 X10(3)/MCL (ref 2.1–9.2)
NEUTROPHILS NFR BLD AUTO: 66.6 %
NITRITE UR QL STRIP: NEGATIVE
NRBC BLD AUTO-RTO: 0 %
PH UR STRIP: 7 [PH]
PLATELET # BLD AUTO: 331 X10(3)/MCL (ref 130–400)
PMV BLD AUTO: 11.1 FL (ref 7.4–10.4)
POTASSIUM SERPL-SCNC: 4.4 MMOL/L (ref 3.5–5.1)
PROT SERPL-MCNC: 7.1 GM/DL (ref 5.8–7.6)
PROT UR QL STRIP: NEGATIVE
RBC # BLD AUTO: 4.54 X10(6)/MCL (ref 4.2–5.4)
SODIUM SERPL-SCNC: 140 MMOL/L (ref 136–145)
SP GR UR STRIP.AUTO: 1.01 (ref 1–1.03)
TRIGL SERPL-MCNC: 112 MG/DL (ref 37–140)
TSH SERPL-ACNC: 0.79 UIU/ML (ref 0.35–4.94)
UROBILINOGEN UR STRIP-ACNC: 0.2
VLDLC SERPL CALC-MCNC: 22 MG/DL
WBC # BLD AUTO: 5.4 X10(3)/MCL (ref 4.5–11.5)

## 2024-06-14 PROCEDURE — 84443 ASSAY THYROID STIM HORMONE: CPT | Performed by: NURSE PRACTITIONER

## 2024-06-14 PROCEDURE — 80053 COMPREHEN METABOLIC PANEL: CPT | Performed by: NURSE PRACTITIONER

## 2024-06-14 PROCEDURE — 85025 COMPLETE CBC W/AUTO DIFF WBC: CPT | Performed by: NURSE PRACTITIONER

## 2024-06-14 PROCEDURE — 80061 LIPID PANEL: CPT | Performed by: NURSE PRACTITIONER

## 2024-06-14 PROCEDURE — 81003 URINALYSIS AUTO W/O SCOPE: CPT | Performed by: NURSE PRACTITIONER

## 2024-06-14 PROCEDURE — 36415 COLL VENOUS BLD VENIPUNCTURE: CPT

## 2024-06-24 PROBLEM — J01.40 ACUTE PANSINUSITIS: Status: RESOLVED | Noted: 2024-03-25 | Resolved: 2024-06-24

## 2024-06-25 ENCOUNTER — OFFICE VISIT (OUTPATIENT)
Dept: FAMILY MEDICINE | Facility: CLINIC | Age: 64
End: 2024-06-25
Payer: MEDICARE

## 2024-06-25 VITALS
BODY MASS INDEX: 39.08 KG/M2 | TEMPERATURE: 98 F | SYSTOLIC BLOOD PRESSURE: 128 MMHG | HEIGHT: 61 IN | RESPIRATION RATE: 20 BRPM | HEART RATE: 89 BPM | DIASTOLIC BLOOD PRESSURE: 53 MMHG | WEIGHT: 207 LBS

## 2024-06-25 DIAGNOSIS — M25.511 CHRONIC RIGHT SHOULDER PAIN: ICD-10-CM

## 2024-06-25 DIAGNOSIS — G89.29 CHRONIC RIGHT SHOULDER PAIN: ICD-10-CM

## 2024-06-25 DIAGNOSIS — I10 HYPERTENSION, UNSPECIFIED TYPE: ICD-10-CM

## 2024-06-25 DIAGNOSIS — Z12.31 OTHER SCREENING MAMMOGRAM: ICD-10-CM

## 2024-06-25 DIAGNOSIS — E66.01 CLASS 2 SEVERE OBESITY DUE TO EXCESS CALORIES WITH SERIOUS COMORBIDITY AND BODY MASS INDEX (BMI) OF 39.0 TO 39.9 IN ADULT: ICD-10-CM

## 2024-06-25 PROCEDURE — 1159F MED LIST DOCD IN RCRD: CPT | Mod: ,,, | Performed by: NURSE PRACTITIONER

## 2024-06-25 PROCEDURE — 3074F SYST BP LT 130 MM HG: CPT | Mod: ,,, | Performed by: NURSE PRACTITIONER

## 2024-06-25 PROCEDURE — 3008F BODY MASS INDEX DOCD: CPT | Mod: ,,, | Performed by: NURSE PRACTITIONER

## 2024-06-25 PROCEDURE — 3078F DIAST BP <80 MM HG: CPT | Mod: ,,, | Performed by: NURSE PRACTITIONER

## 2024-06-25 PROCEDURE — 4010F ACE/ARB THERAPY RXD/TAKEN: CPT | Mod: ,,, | Performed by: NURSE PRACTITIONER

## 2024-06-25 PROCEDURE — 1126F AMNT PAIN NOTED NONE PRSNT: CPT | Mod: ,,, | Performed by: NURSE PRACTITIONER

## 2024-06-25 PROCEDURE — 99214 OFFICE O/P EST MOD 30 MIN: CPT | Mod: ,,, | Performed by: NURSE PRACTITIONER

## 2024-06-25 NOTE — PROGRESS NOTES
"Subjective:       Patient ID: Jasmyne Bone is a 63 y.o. female.    Chief Complaint: Hypertension (Check up), Anxiety (Check up), and Results (Lab results)    The patient is a 63-year-old female with a BMI of 39.11.    The patient has a history of hypertension and obesity.  Patient's blood pressure today is 128/53.    Shoulder Pain   The pain is present in the right shoulder. This is a chronic problem. The current episode started more than 1 month ago. There has been a history of trauma. The problem occurs constantly. The problem has been unchanged. The quality of the pain is described as sharp and aching. The pain is at a severity of 7/10. The pain is moderate. Associated symptoms include a limited range of motion, numbness and tingling. The symptoms are aggravated by activity and lying down. She has tried cold and OTC ointments for the symptoms. The treatment provided mild relief.       Review of Systems   Neurological:  Positive for tingling and numbness.    12 point review of systems conducted, negative except as stated in the history of present illness. See HPI for details.        Objective:        Visit Vitals  BP (!) 128/53   Pulse 89   Temp 98.1 °F (36.7 °C)   Resp 20   Ht 5' 1" (1.549 m)   Wt 93.9 kg (207 lb)   BMI 39.11 kg/m²        Physical Exam      Assessment:           ICD-10-CM ICD-9-CM   1. Hypertension, unspecified type  I10 401.9   2. Class 2 severe obesity due to excess calories with serious comorbidity and body mass index (BMI) of 39.0 to 39.9 in adult  E66.01 278.01    Z68.39 V85.39   3. Other screening mammogram  Z12.31 V76.12   4. Chronic right shoulder pain  M25.511 719.41    G89.29 338.29            Plan:         1. Hypertension, unspecified type  Hypertension  Controlled  Continue current medication  Return to clinic with any concerns    2. Class 2 severe obesity due to excess calories with serious comorbidity and body mass index (BMI) of 39.0 to 39.9 in adult  Obesity  Reduced calorie " diet modification  Frequent self weighing discussed  Exercise/lifestyle modification    3. Other screening mammogram  - Mammo Digital Screening Bilat w/ [facility]; Future            Follow up in about 6 months (around 12/25/2024).     Future Appointments   Date Time Provider Department Center   1/2/2025  8:00 AM NURSE, [facility] FAM[facility] MEDIC[facility] [place]   1/8/2025 10:00 AM Cassia Lugo NP [facility] [place]        Past Medical History:   Diagnosis Date    Hyperlipidemia     Personal history of colonic polyps 08/22/2018        Review of patient's allergies indicates:  No Known Allergies     Current Outpatient Medications   Medication Instructions    aspirin (ECOTRIN) 81 mg, Oral, Daily    azelastine (ASTELIN) 137 mcg, Nasal, 2 times daily    EScitalopram oxalate (LEXAPRO) 20 mg, Oral, Daily    estradioL (ESTRACE) 1 g, Vaginal, Daily    gabapentin (NEURONTIN) 300 MG capsule TAKE ONE CAPSULE BY MOUTH EACH EVENING    lisinopriL (PRINIVIL,ZESTRIL) 5 mg, Oral, Daily    meclizine (ANTIVERT) 25 mg, Oral, 3 times daily PRN    montelukast (SINGULAIR) 10 mg, Oral, Nightly    omeprazole (PRILOSEC) 20 mg, Oral, Daily    pravastatin (PRAVACHOL) 40 MG tablet 1 tablet, Oral, Daily          Patient Active Problem List   Diagnosis    Dorsalgia, unspecified    Class 2 severe obesity due to excess calories with serious comorbidity in adult    Fatigue    Anxiety    Depression    Hypertension    Abnormal urinalysis    Bradycardia    Other screening mammogram    Dysuria    Vertigo    Atrophic vaginitis    Chronic bilateral low back pain with bilateral sciatica    Benign paroxysmal positional vertigo due to bilateral vestibular disorder    Cough    Wheezing    Fever    Chronic right shoulder pain             Past Medical History:   Diagnosis Date    Hyperlipidemia     Personal history of colonic polyps 08/22/2018          Past Surgical History:   Procedure Laterality Date    COLONOSCOPY  08/22/2018    Damien Valverde MD    TONSILLECTOMY              reports that she quit smoking about 44 years ago. Her smoking use included cigarettes. She has been exposed to tobacco smoke. She has never used smokeless tobacco. She reports current alcohol use of about 5.0 - 6.0 standard drinks of alcohol per week. She reports that she does not use drugs.    Immunization History   Administered Date(s) Administered    COVID-19, MRNA, LN-S, PF (Pfizer) (Purple Cap) 03/22/2021, 04/12/2021        Health Maintenance   Topic Date Due    TETANUS VACCINE  Never done    DEXA Scan  Never done    Shingles Vaccine (1 of 2) Never done    Colorectal Cancer Screening  08/22/2023    Mammogram  06/07/2024    Lipid Panel  06/14/2029    Hepatitis C Screening  Completed

## 2024-07-23 ENCOUNTER — HOSPITAL ENCOUNTER (OUTPATIENT)
Dept: RADIOLOGY | Facility: HOSPITAL | Age: 64
Discharge: HOME | End: 2024-07-23
Attending: NURSE PRACTITIONER
Payer: MEDICARE

## 2024-07-23 DIAGNOSIS — Z12.31 OTHER SCREENING MAMMOGRAM: ICD-10-CM

## 2024-07-23 PROCEDURE — 77067 SCR MAMMO BI INCL CAD: CPT | Mod: TC

## 2024-09-16 DIAGNOSIS — I10 HYPERTENSION, UNSPECIFIED TYPE: ICD-10-CM

## 2024-09-16 DIAGNOSIS — F32.A DEPRESSION, UNSPECIFIED DEPRESSION TYPE: ICD-10-CM

## 2024-09-16 RX ORDER — LISINOPRIL 5 MG/1
5 TABLET ORAL
Qty: 90 TABLET | Refills: 3 | Status: SHIPPED | OUTPATIENT
Start: 2024-09-16

## 2024-09-16 RX ORDER — ESCITALOPRAM OXALATE 20 MG/1
20 TABLET ORAL
Qty: 30 TABLET | Refills: 11 | Status: SHIPPED | OUTPATIENT
Start: 2024-09-16

## 2024-10-11 ENCOUNTER — CLINICAL SUPPORT (OUTPATIENT)
Dept: FAMILY MEDICINE | Facility: CLINIC | Age: 64
End: 2024-10-11
Payer: MEDICARE

## 2024-10-11 DIAGNOSIS — Z23 FLU VACCINE NEED: Primary | ICD-10-CM

## 2025-01-20 ENCOUNTER — PATIENT MESSAGE (OUTPATIENT)
Dept: ADMINISTRATIVE | Facility: HOSPITAL | Age: 65
End: 2025-01-20
Payer: MEDICARE

## 2025-01-20 DIAGNOSIS — Z12.11 COLON CANCER SCREENING: Primary | ICD-10-CM

## 2025-01-27 DIAGNOSIS — R21 RASH: Primary | ICD-10-CM

## 2025-01-27 RX ORDER — CLOTRIMAZOLE AND BETAMETHASONE DIPROPIONATE 10; .64 MG/G; MG/G
CREAM TOPICAL 2 TIMES DAILY
Qty: 15 G | Refills: 0 | Status: SHIPPED | OUTPATIENT
Start: 2025-01-27

## 2025-05-19 RX ORDER — PRAVASTATIN SODIUM 40 MG/1
TABLET ORAL
Qty: 30 TABLET | Refills: 1 | Status: SHIPPED | OUTPATIENT
Start: 2025-05-19

## 2025-06-10 RX ORDER — PRAVASTATIN SODIUM 40 MG/1
TABLET ORAL
Qty: 30 TABLET | Refills: 1 | Status: ON HOLD | OUTPATIENT
Start: 2025-06-10

## 2025-06-13 ENCOUNTER — HOSPITAL ENCOUNTER (INPATIENT)
Facility: HOSPITAL | Age: 65
LOS: 2 days | Discharge: HOME HEALTH | DRG: 074 | End: 2025-06-16
Attending: EMERGENCY MEDICINE
Payer: MEDICARE

## 2025-06-13 DIAGNOSIS — M48.062 LUMBAR STENOSIS WITH NEUROGENIC CLAUDICATION: ICD-10-CM

## 2025-06-13 DIAGNOSIS — I63.9 STROKE: ICD-10-CM

## 2025-06-13 DIAGNOSIS — G45.9 TIA (TRANSIENT ISCHEMIC ATTACK): Primary | ICD-10-CM

## 2025-06-13 DIAGNOSIS — R53.1 WEAKNESS: ICD-10-CM

## 2025-06-13 DIAGNOSIS — R20.0 LEFT ARM NUMBNESS: ICD-10-CM

## 2025-06-13 DIAGNOSIS — R07.9 CHEST PAIN: ICD-10-CM

## 2025-06-13 PROBLEM — E66.9 OBESITY: Status: ACTIVE | Noted: 2025-06-13

## 2025-06-13 LAB
ALBUMIN SERPL-MCNC: 3.9 G/DL (ref 3.4–4.8)
ALBUMIN/GLOB SERPL: 1.1 RATIO (ref 1.1–2)
ALP SERPL-CCNC: 90 UNIT/L (ref 40–150)
ALT SERPL-CCNC: 16 UNIT/L (ref 0–55)
ANION GAP SERPL CALC-SCNC: 7 MEQ/L
APTT PPP: 28.1 SECONDS (ref 23.2–33.7)
AST SERPL-CCNC: 16 UNIT/L (ref 11–45)
BACTERIA #/AREA URNS AUTO: ABNORMAL /HPF
BASOPHILS # BLD AUTO: 0.05 X10(3)/MCL
BASOPHILS NFR BLD AUTO: 0.8 %
BILIRUB SERPL-MCNC: 0.5 MG/DL
BILIRUB UR QL STRIP.AUTO: NEGATIVE
BUN SERPL-MCNC: 16.5 MG/DL (ref 9.8–20.1)
CALCIUM SERPL-MCNC: 8.9 MG/DL (ref 8.4–10.2)
CHLORIDE SERPL-SCNC: 106 MMOL/L (ref 98–107)
CHOLEST SERPL-MCNC: 186 MG/DL
CHOLEST/HDLC SERPL: 3 {RATIO} (ref 0–5)
CLARITY UR: CLEAR
CO2 SERPL-SCNC: 26 MMOL/L (ref 23–31)
COLOR UR AUTO: ABNORMAL
CREAT SERPL-MCNC: 0.9 MG/DL (ref 0.55–1.02)
CREAT/UREA NIT SERPL: 18
EOSINOPHIL # BLD AUTO: 0.12 X10(3)/MCL (ref 0–0.9)
EOSINOPHIL NFR BLD AUTO: 1.8 %
ERYTHROCYTE [DISTWIDTH] IN BLOOD BY AUTOMATED COUNT: 13.2 % (ref 11.5–17)
GFR SERPLBLD CREATININE-BSD FMLA CKD-EPI: >60 ML/MIN/1.73/M2
GLOBULIN SER-MCNC: 3.6 GM/DL (ref 2.4–3.5)
GLUCOSE SERPL-MCNC: 114 MG/DL (ref 82–115)
GLUCOSE UR QL STRIP: NEGATIVE
HCT VFR BLD AUTO: 40.8 % (ref 37–47)
HDLC SERPL-MCNC: 54 MG/DL (ref 35–60)
HGB BLD-MCNC: 13.1 G/DL (ref 12–16)
HGB UR QL STRIP: NEGATIVE
IMM GRANULOCYTES # BLD AUTO: 0.02 X10(3)/MCL (ref 0–0.04)
IMM GRANULOCYTES NFR BLD AUTO: 0.3 %
INR PPP: 0.9
KETONES UR QL STRIP: NEGATIVE
LDLC SERPL CALC-MCNC: 108 MG/DL (ref 50–140)
LEUKOCYTE ESTERASE UR QL STRIP: 250
LYMPHOCYTES # BLD AUTO: 1.87 X10(3)/MCL (ref 0.6–4.6)
LYMPHOCYTES NFR BLD AUTO: 28.5 %
MAGNESIUM SERPL-MCNC: 1.9 MG/DL (ref 1.6–2.6)
MCH RBC QN AUTO: 27.9 PG (ref 27–31)
MCHC RBC AUTO-ENTMCNC: 32.1 G/DL (ref 33–36)
MCV RBC AUTO: 87 FL (ref 80–94)
MONOCYTES # BLD AUTO: 0.42 X10(3)/MCL (ref 0.1–1.3)
MONOCYTES NFR BLD AUTO: 6.4 %
NEUTROPHILS # BLD AUTO: 4.08 X10(3)/MCL (ref 2.1–9.2)
NEUTROPHILS NFR BLD AUTO: 62.2 %
NITRITE UR QL STRIP: NEGATIVE
NRBC BLD AUTO-RTO: 0 %
OHS QRS DURATION: 90 MS
OHS QTC CALCULATION: 418 MS
PH UR STRIP: 6 [PH]
PLATELET # BLD AUTO: 281 X10(3)/MCL (ref 130–400)
PMV BLD AUTO: 10.5 FL (ref 7.4–10.4)
POCT GLUCOSE: 105 MG/DL (ref 70–110)
POTASSIUM SERPL-SCNC: 3.8 MMOL/L (ref 3.5–5.1)
PROT SERPL-MCNC: 7.5 GM/DL (ref 5.8–7.6)
PROT UR QL STRIP: NEGATIVE
PROTHROMBIN TIME: 28.1 SECONDS (ref 12.5–14.5)
RBC # BLD AUTO: 4.69 X10(6)/MCL (ref 4.2–5.4)
RBC #/AREA URNS AUTO: ABNORMAL /HPF
SODIUM SERPL-SCNC: 139 MMOL/L (ref 136–145)
SP GR UR STRIP.AUTO: 1.02 (ref 1–1.03)
SQUAMOUS #/AREA URNS LPF: ABNORMAL /HPF
TRIGL SERPL-MCNC: 118 MG/DL (ref 37–140)
TROPONIN I SERPL-MCNC: <0.01 NG/ML (ref 0–0.04)
TSH SERPL-ACNC: 1.24 UIU/ML (ref 0.35–4.94)
UROBILINOGEN UR STRIP-ACNC: 2
VLDLC SERPL CALC-MCNC: 24 MG/DL
WBC # BLD AUTO: 6.56 X10(3)/MCL (ref 4.5–11.5)
WBC #/AREA URNS AUTO: ABNORMAL /HPF

## 2025-06-13 PROCEDURE — 81001 URINALYSIS AUTO W/SCOPE: CPT | Performed by: PHYSICIAN ASSISTANT

## 2025-06-13 PROCEDURE — 84484 ASSAY OF TROPONIN QUANT: CPT | Performed by: PHYSICIAN ASSISTANT

## 2025-06-13 PROCEDURE — 80061 LIPID PANEL: CPT | Performed by: NURSE PRACTITIONER

## 2025-06-13 PROCEDURE — 25000003 PHARM REV CODE 250: Performed by: STUDENT IN AN ORGANIZED HEALTH CARE EDUCATION/TRAINING PROGRAM

## 2025-06-13 PROCEDURE — 93010 ELECTROCARDIOGRAM REPORT: CPT | Mod: ,,, | Performed by: INTERNAL MEDICINE

## 2025-06-13 PROCEDURE — 83735 ASSAY OF MAGNESIUM: CPT | Performed by: PHYSICIAN ASSISTANT

## 2025-06-13 PROCEDURE — G0378 HOSPITAL OBSERVATION PER HR: HCPCS

## 2025-06-13 PROCEDURE — 25500020 PHARM REV CODE 255: Performed by: STUDENT IN AN ORGANIZED HEALTH CARE EDUCATION/TRAINING PROGRAM

## 2025-06-13 PROCEDURE — 93005 ELECTROCARDIOGRAM TRACING: CPT

## 2025-06-13 PROCEDURE — 84443 ASSAY THYROID STIM HORMONE: CPT | Performed by: NURSE PRACTITIONER

## 2025-06-13 PROCEDURE — 25000003 PHARM REV CODE 250: Performed by: NURSE PRACTITIONER

## 2025-06-13 PROCEDURE — 85025 COMPLETE CBC W/AUTO DIFF WBC: CPT | Performed by: PHYSICIAN ASSISTANT

## 2025-06-13 PROCEDURE — 85730 THROMBOPLASTIN TIME PARTIAL: CPT | Performed by: PHYSICIAN ASSISTANT

## 2025-06-13 PROCEDURE — 85610 PROTHROMBIN TIME: CPT | Performed by: PHYSICIAN ASSISTANT

## 2025-06-13 PROCEDURE — 80053 COMPREHEN METABOLIC PANEL: CPT | Performed by: PHYSICIAN ASSISTANT

## 2025-06-13 RX ORDER — NALOXONE HCL 0.4 MG/ML
0.02 VIAL (ML) INJECTION
Status: DISCONTINUED | OUTPATIENT
Start: 2025-06-13 | End: 2025-06-16 | Stop reason: HOSPADM

## 2025-06-13 RX ORDER — HYDROCODONE BITARTRATE AND ACETAMINOPHEN 5; 325 MG/1; MG/1
1 TABLET ORAL EVERY 6 HOURS PRN
Refills: 0 | Status: DISCONTINUED | OUTPATIENT
Start: 2025-06-13 | End: 2025-06-16 | Stop reason: HOSPADM

## 2025-06-13 RX ORDER — GABAPENTIN 300 MG/1
300 CAPSULE ORAL NIGHTLY
Status: DISCONTINUED | OUTPATIENT
Start: 2025-06-13 | End: 2025-06-16 | Stop reason: HOSPADM

## 2025-06-13 RX ORDER — ESCITALOPRAM OXALATE 10 MG/1
20 TABLET ORAL DAILY
Status: DISCONTINUED | OUTPATIENT
Start: 2025-06-14 | End: 2025-06-16 | Stop reason: HOSPADM

## 2025-06-13 RX ORDER — HYDRALAZINE HYDROCHLORIDE 20 MG/ML
10 INJECTION INTRAMUSCULAR; INTRAVENOUS EVERY 6 HOURS PRN
Status: DISCONTINUED | OUTPATIENT
Start: 2025-06-13 | End: 2025-06-16 | Stop reason: HOSPADM

## 2025-06-13 RX ORDER — ASPIRIN 325 MG
325 TABLET, DELAYED RELEASE (ENTERIC COATED) ORAL ONCE
Status: COMPLETED | OUTPATIENT
Start: 2025-06-13 | End: 2025-06-13

## 2025-06-13 RX ORDER — ENOXAPARIN SODIUM 100 MG/ML
40 INJECTION SUBCUTANEOUS EVERY 24 HOURS
Status: DISCONTINUED | OUTPATIENT
Start: 2025-06-14 | End: 2025-06-16 | Stop reason: HOSPADM

## 2025-06-13 RX ORDER — MORPHINE SULFATE 4 MG/ML
2 INJECTION, SOLUTION INTRAMUSCULAR; INTRAVENOUS EVERY 6 HOURS PRN
Refills: 0 | Status: DISCONTINUED | OUTPATIENT
Start: 2025-06-13 | End: 2025-06-16 | Stop reason: HOSPADM

## 2025-06-13 RX ORDER — LISINOPRIL 2.5 MG/1
5 TABLET ORAL DAILY
Status: DISCONTINUED | OUTPATIENT
Start: 2025-06-14 | End: 2025-06-16 | Stop reason: HOSPADM

## 2025-06-13 RX ORDER — PROCHLORPERAZINE EDISYLATE 5 MG/ML
5 INJECTION INTRAMUSCULAR; INTRAVENOUS EVERY 6 HOURS PRN
Status: DISCONTINUED | OUTPATIENT
Start: 2025-06-13 | End: 2025-06-13

## 2025-06-13 RX ORDER — GLUCAGON 1 MG
1 KIT INJECTION
Status: DISCONTINUED | OUTPATIENT
Start: 2025-06-13 | End: 2025-06-16 | Stop reason: HOSPADM

## 2025-06-13 RX ORDER — IPRATROPIUM BROMIDE AND ALBUTEROL SULFATE 2.5; .5 MG/3ML; MG/3ML
3 SOLUTION RESPIRATORY (INHALATION) EVERY 6 HOURS PRN
Status: DISCONTINUED | OUTPATIENT
Start: 2025-06-13 | End: 2025-06-16 | Stop reason: HOSPADM

## 2025-06-13 RX ORDER — IBUPROFEN 200 MG
16 TABLET ORAL
Status: DISCONTINUED | OUTPATIENT
Start: 2025-06-13 | End: 2025-06-16 | Stop reason: HOSPADM

## 2025-06-13 RX ORDER — SIMETHICONE 80 MG
1 TABLET,CHEWABLE ORAL 4 TIMES DAILY PRN
Status: DISCONTINUED | OUTPATIENT
Start: 2025-06-13 | End: 2025-06-16 | Stop reason: HOSPADM

## 2025-06-13 RX ORDER — POLYETHYLENE GLYCOL 3350 17 G/17G
17 POWDER, FOR SOLUTION ORAL 3 TIMES DAILY PRN
Status: DISCONTINUED | OUTPATIENT
Start: 2025-06-13 | End: 2025-06-16 | Stop reason: HOSPADM

## 2025-06-13 RX ORDER — ONDANSETRON HYDROCHLORIDE 2 MG/ML
4 INJECTION, SOLUTION INTRAVENOUS EVERY 8 HOURS PRN
Status: DISCONTINUED | OUTPATIENT
Start: 2025-06-13 | End: 2025-06-16 | Stop reason: HOSPADM

## 2025-06-13 RX ORDER — SODIUM CHLORIDE 0.9 % (FLUSH) 0.9 %
10 SYRINGE (ML) INJECTION
Status: DISCONTINUED | OUTPATIENT
Start: 2025-06-13 | End: 2025-06-16 | Stop reason: HOSPADM

## 2025-06-13 RX ORDER — IBUPROFEN 200 MG
24 TABLET ORAL
Status: DISCONTINUED | OUTPATIENT
Start: 2025-06-13 | End: 2025-06-16 | Stop reason: HOSPADM

## 2025-06-13 RX ORDER — ONDANSETRON 4 MG/1
8 TABLET, ORALLY DISINTEGRATING ORAL EVERY 8 HOURS PRN
Status: DISCONTINUED | OUTPATIENT
Start: 2025-06-13 | End: 2025-06-16 | Stop reason: HOSPADM

## 2025-06-13 RX ORDER — ALUMINUM HYDROXIDE, MAGNESIUM HYDROXIDE, AND SIMETHICONE 1200; 120; 1200 MG/30ML; MG/30ML; MG/30ML
30 SUSPENSION ORAL 4 TIMES DAILY PRN
Status: DISCONTINUED | OUTPATIENT
Start: 2025-06-13 | End: 2025-06-16 | Stop reason: HOSPADM

## 2025-06-13 RX ORDER — LABETALOL HYDROCHLORIDE 5 MG/ML
10 INJECTION, SOLUTION INTRAVENOUS
Status: DISCONTINUED | OUTPATIENT
Start: 2025-06-13 | End: 2025-06-13

## 2025-06-13 RX ORDER — ACETAMINOPHEN 500 MG
1000 TABLET ORAL EVERY 6 HOURS PRN
Status: DISCONTINUED | OUTPATIENT
Start: 2025-06-13 | End: 2025-06-13

## 2025-06-13 RX ORDER — BISACODYL 10 MG/1
10 SUPPOSITORY RECTAL DAILY PRN
Status: DISCONTINUED | OUTPATIENT
Start: 2025-06-13 | End: 2025-06-16 | Stop reason: HOSPADM

## 2025-06-13 RX ORDER — BISACODYL 10 MG/1
10 SUPPOSITORY RECTAL DAILY PRN
Status: DISCONTINUED | OUTPATIENT
Start: 2025-06-13 | End: 2025-06-13

## 2025-06-13 RX ORDER — TALC
9 POWDER (GRAM) TOPICAL NIGHTLY PRN
Status: DISCONTINUED | OUTPATIENT
Start: 2025-06-13 | End: 2025-06-16 | Stop reason: HOSPADM

## 2025-06-13 RX ORDER — PRAVASTATIN SODIUM 40 MG/1
40 TABLET ORAL DAILY
Status: DISCONTINUED | OUTPATIENT
Start: 2025-06-14 | End: 2025-06-14

## 2025-06-13 RX ORDER — ASPIRIN 81 MG/1
81 TABLET ORAL DAILY
Status: DISCONTINUED | OUTPATIENT
Start: 2025-06-14 | End: 2025-06-16 | Stop reason: HOSPADM

## 2025-06-13 RX ORDER — ONDANSETRON HYDROCHLORIDE 2 MG/ML
4 INJECTION, SOLUTION INTRAVENOUS EVERY 4 HOURS PRN
Status: DISCONTINUED | OUTPATIENT
Start: 2025-06-13 | End: 2025-06-13

## 2025-06-13 RX ORDER — ACETAMINOPHEN 325 MG/1
650 TABLET ORAL EVERY 4 HOURS PRN
Status: DISCONTINUED | OUTPATIENT
Start: 2025-06-13 | End: 2025-06-16 | Stop reason: HOSPADM

## 2025-06-13 RX ORDER — SODIUM CHLORIDE 0.9 % (FLUSH) 0.9 %
10 SYRINGE (ML) INJECTION
Status: DISCONTINUED | OUTPATIENT
Start: 2025-06-13 | End: 2025-06-13

## 2025-06-13 RX ADMIN — IOHEXOL 100 ML: 350 INJECTION, SOLUTION INTRAVENOUS at 09:06

## 2025-06-13 RX ADMIN — ASPIRIN 325 MG: 325 TABLET, COATED ORAL at 09:06

## 2025-06-13 RX ADMIN — GABAPENTIN 300 MG: 300 CAPSULE ORAL at 11:06

## 2025-06-13 NOTE — ED PROVIDER NOTES
Encounter Date: 6/13/2025    SCRIBE #1 NOTE: I, Denis Cardenasbert, am scribing for, and in the presence of,  Anil Garcia MD. I have scribed the following portions of the note - the EKG reading. Other sections scribed: HPI, ROS, PE.       History     Chief Complaint   Patient presents with    Numbness     Intermittent numbness to right arm with expressive aphasia that started an hour ago but has resolved prior to arrival. Pt also c/o headache and low heart rate. Seen cardiology 2 days ago and scheduled for a halter monitor June 24th. Pt also c/o neck pain that started 3 days ago.    Headache    Bradycardia     The patient is a 63 y/o female with a hx of HTN, HLD, and GERD who presents to the ED for numbness beginning today. The patient states earlier today she had an episode of right arm numbness and expressive aphasia with associated onset of headache and neck pain. She states she was seen by her cardiologist on Wednesday of this week and is scheduled for a halter monitor and carotid artery ultrasound. The patient does note she has been more generally weak for the past few weeks.         The history is provided by the patient and medical records. No  was used.   Neurologic Problem  The primary symptoms include headaches and paresthesias. The symptoms began just prior to arrival. The symptoms are resolved. The neurological symptoms are focal.   The headache began today. Headache is a new problem. The headache is associated with paresthesias.   Affected locations include the: right upper arm, right forearm and right hand.   Medical issues also include hypertension.     Review of patient's allergies indicates:  No Known Allergies  Past Medical History:   Diagnosis Date    GERD (gastroesophageal reflux disease)     Hyperlipidemia     Hypertension     Personal history of colonic polyps 08/22/2018     Past Surgical History:   Procedure Laterality Date    COLONOSCOPY  08/22/2018    Damien Valverde  MD    TONSILLECTOMY       Family History   Problem Relation Name Age of Onset    Cancer Mother      COPD Father       Social History[1]  Review of Systems   HENT:  Negative for congestion.    Respiratory:  Negative for shortness of breath.    Neurological:  Positive for headaches and paresthesias.       Physical Exam     Initial Vitals [06/13/25 1616]   BP Pulse Resp Temp SpO2   (!) 160/86 (!) 58 18 98.2 °F (36.8 °C) 99 %      MAP       --         Physical Exam    Nursing note and vitals reviewed.  Constitutional: She appears well-developed and well-nourished.   HENT:   Head: Normocephalic and atraumatic.   Right Ear: External ear normal.   Left Ear: External ear normal.   Eyes: Conjunctivae and EOM are normal. Pupils are equal, round, and reactive to light.   Neck: Neck supple.   Normal range of motion.  Cardiovascular:  Regular rhythm, normal heart sounds and intact distal pulses.           Pulmonary/Chest: Breath sounds normal.   Abdominal: Abdomen is soft. Bowel sounds are normal.   Musculoskeletal:         General: Normal range of motion.      Cervical back: Normal range of motion and neck supple.     Neurological: She is alert and oriented to person, place, and time. She has normal strength. No cranial nerve deficit or sensory deficit. GCS score is 15. GCS eye subscore is 4. GCS verbal subscore is 5. GCS motor subscore is 6.   5/5  strength to the bilateral upper extremities. Sensation intact to light touch to the bilateral upper extremities. No pronator drift.    Skin: Skin is warm and dry. Capillary refill takes less than 2 seconds.   Psychiatric: Her behavior is normal. Judgment and thought content normal. Her mood appears anxious.         ED Course   Procedures  Labs Reviewed   COMPREHENSIVE METABOLIC PANEL - Abnormal       Result Value    Sodium 139      Potassium 3.8      Chloride 106      CO2 26      Glucose 114      Blood Urea Nitrogen 16.5      Creatinine 0.90      Calcium 8.9      Protein Total  7.5      Albumin 3.9      Globulin 3.6 (*)     Albumin/Globulin Ratio 1.1      Bilirubin Total 0.5      ALP 90      ALT 16      AST 16      eGFR >60      Anion Gap 7.0      BUN/Creatinine Ratio 18     PROTIME-INR - Abnormal    PT 28.1 (*)     INR 0.9      Narrative:     Protimes are used to monitor anticoagulant agents such as warfarin. PT INR values are based on the current patient normal mean and the BERTIN value for the specific instrument reagent used.  **Routine theraputic target values for the INR are 2.0-3.0**   URINALYSIS, REFLEX TO URINE CULTURE - Abnormal    Color, UA Light-Yellow      Appearance, UA Clear      Specific Gravity, UA 1.023      pH, UA 6.0      Protein, UA Negative      Glucose, UA Negative      Ketones, UA Negative      Blood, UA Negative      Bilirubin, UA Negative      Urobilinogen, UA 2.0 (*)     Nitrites, UA Negative      Leukocyte Esterase,  (*)     RBC, UA 0-5      WBC, UA 6-10 (*)     Bacteria, UA None Seen      Squamous Epithelial Cells, UA Trace     CBC WITH DIFFERENTIAL - Abnormal    WBC 6.56      RBC 4.69      Hgb 13.1      Hct 40.8      MCV 87.0      MCH 27.9      MCHC 32.1 (*)     RDW 13.2      Platelet 281      MPV 10.5 (*)     Neut % 62.2      Lymph % 28.5      Mono % 6.4      Eos % 1.8      Basophil % 0.8      Imm Grans % 0.3      Neut # 4.08      Lymph # 1.87      Mono # 0.42      Eos # 0.12      Baso # 0.05      Imm Gran # 0.02      NRBC% 0.0     APTT - Normal    PTT 28.1     MAGNESIUM - Normal    Magnesium Level 1.90     TROPONIN I - Normal    Troponin-I <0.010     CBC W/ AUTO DIFFERENTIAL    Narrative:     The following orders were created for panel order CBC auto differential.  Procedure                               Abnormality         Status                     ---------                               -----------         ------                     CBC with Differential[[id number]]       Abnormal            Final result                 Please view results for these  tests on the individual orders.   POCT GLUCOSE, HAND-HELD DEVICE   POCT GLUCOSE    POCT Glucose 105       EKG Readings: (Independently Interpreted)   Initial Reading: No STEMI. Rhythm: Sinus Bradycardia. Heart Rate: 58. Ectopy: No Ectopy. Conduction: LAFB. ST Segments: Normal ST Segments. T Waves Flipped: V2. Axis: Normal. Clinical Impression: Sinus Bradycardia   Done at 16:18     ECG Results              EKG 12-lead (Weakness) Age > 50 (Final result)        Collection Time Result Time QRS Duration OHS QTC Calculation    06/13/25 16:18:04 06/13/25 17:37:49 90 418                     Final result by Interface, Lab In [facility] (06/13/25 17:37:57)                   Narrative:    Test Reason : R53.1,    Vent. Rate :  58 BPM     Atrial Rate :  58 BPM     P-R Int : 154 ms          QRS Dur :  90 ms      QT Int : 426 ms       P-R-T Axes :  42 -49  33 degrees    QTcB Int : 418 ms    Sinus bradycardia  Left anterior fascicular block  Anterolateral infarct ,age undetermined  Abnormal ECG  No previous ECGs available  Confirmed by Jayant Garcia ([id number]) on 6/13/2025 5:37:46 PM    Referred By:            Confirmed By: Jayant Garcia                                  Imaging Results              CT Head Without Contrast (Final result)  Result time 06/13/25 17:21:53      Final result by Keri Shetty MD (06/13/25 17:21:53)                   Impression:      No acute intracranial abnormality.      Electronically signed by: Keri Shetty  Date:    06/13/2025  Time:    17:21               Narrative:    EXAMINATION:  CT HEAD WITHOUT CONTRAST    CLINICAL HISTORY:  Headache, new or worsening (Age >= 50y);    TECHNIQUE:  Axial scans were obtained from skull base to the vertex.    Coronal and sagittal reconstructions obtained from the axial data.    Automatic exposure control was utilized to limit radiation dose.    Contrast: None    Radiation Dose:    Total DLP: 886 mGy*cm    COMPARISON:  None    FINDINGS:  There is no acute  intracranial hemorrhage or edema. The gray-white matter differentiation is preserved.    There is no mass effect or midline shift. The ventricles and sulci are normal in size. The basal cisterns are patent. There is no abnormal extra-axial fluid collection.    The calvarium and skull base are intact. The visualized paranasal sinuses and the mastoid air cells are clear.                                       Medications - No data to display  Medical Decision Making  Differential diagnosis includes but is not limited to: TIA, stroke, seizure, neoplasm, cervical radiculopathy     Amount and/or Complexity of Data Reviewed  Labs: ordered.  Radiology: ordered.  ECG/medicine tests: ordered and independent interpretation performed.     Details: EKG Readings: (Independently Interpreted)   Initial Reading: No STEMI. Rhythm: Sinus Bradycardia. Heart Rate: 58. Ectopy: No Ectopy. Conduction: LAFB. ST Segments: Normal ST Segments. T Waves Flipped: V2. Axis: Normal. Clinical Impression: Sinus Bradycardia   Done at 16:18               Scribe Attestation:   Scribe #1: I performed the above scribed service and the documentation accurately describes the services I performed. I attest to the accuracy of the note.    Attending Attestation:           Physician Attestation for Scribe:  Physician Attestation Statement for Scribe #1: I, Anil Garcia MD, reviewed documentation, as scribed by Denis Persaud in my presence, and it is both accurate and complete.             ED Course as of 06/13/25 1928 Fri Jun 13, 2025   1906 Paged [facility] medicine [LH]   1927 I spoke with NEDA Martinez (hospital medicine) regarding admission - Dr. Reyes attending [facility]. [CL]      ED Course User Index  [CL] Anil Garcia MD  [LH] Denis Persaud                           Clinical Impression:  Final diagnoses:  [R20.0] Left arm numbness  [G45.9] TIA (transient ischemic attack) (Primary)          ED Disposition Condition    Observation                        [1]   Social History  Tobacco Use    Smoking status: Former     Current packs/day: 0.00     Types: Cigarettes     Quit date:      Years since quittin.4     Passive exposure: Past    Smokeless tobacco: Never   Substance Use Topics    Alcohol use: Yes     Alcohol/week: 5.0 - 6.0 standard drinks of alcohol     Types: 5 - 6 Glasses of wine per week    Drug use: Never        Anil Garcia MD  25 1925

## 2025-06-13 NOTE — FIRST PROVIDER EVALUATION
"Medical screening examination initiated.  I have conducted a focused provider triage encounter, findings are as follows:  Chief Complaint   Patient presents with    Numbness     Intermittent numbness to right arm with expressive aphasia that started an hour ago but has resolved prior to arrival. Pt also c/o headache and low heart rate. Seen cardiology 2 days ago and scheduled for a halter monitor June 24th. Pt also c/o neck pain that started 3 days ago.    Headache    Bradycardia       Brief history of present illness: 65 yo Female presents to ED for evaluation headache with intermittent right arm numbness with aphasia that started approximately 1 hour ago.  States that she had an episode lasting 5 minutes that has since resolved.  Denies any symptoms at this time.  Patient also reports having intermittent neck pain    Vitals:    06/13/25 1616   BP: (!) 160/86   BP Location: Left arm   Pulse: (!) 58   Resp: 18   Temp: 98.2 °F (36.8 °C)   TempSrc: Oral   SpO2: 99%   Weight: 94.3 kg (208 lb)   Height: 5' 1" (1.549 m)       Pertinent physical exam:  Patient awake and alert.     Brief workup plan:  labs, EKG, CT head.    Preliminary workup initiated; this workup will be continued and followed by the physician or advanced practice provider that is assigned to the patient when roomed.  "

## 2025-06-14 PROBLEM — M48.062 LUMBAR STENOSIS WITH NEUROGENIC CLAUDICATION: Status: ACTIVE | Noted: 2025-06-14

## 2025-06-14 LAB
APICAL FOUR CHAMBER EJECTION FRACTION: 61 %
APICAL TWO CHAMBER EJECTION FRACTION: 50 %
AV INDEX (PROSTH): 0.63
AV MEAN GRADIENT: 4 MMHG
AV PEAK GRADIENT: 8 MMHG
AV VALVE AREA BY VELOCITY RATIO: 1.6 CM²
AV VALVE AREA: 1.6 CM²
AV VELOCITY RATIO: 0.64
BSA FOR ECHO PROCEDURE: 2.02 M2
CV ECHO LV RWT: 0.38 CM
DOP CALC AO PEAK VEL: 1.4 M/S
DOP CALC AO VTI: 33.8 CM
DOP CALC LVOT AREA: 2.5 CM2
DOP CALC LVOT DIAMETER: 1.8 CM
DOP CALC LVOT PEAK VEL: 0.9 M/S
DOP CALC LVOT STROKE VOLUME: 53.9 CM3
DOP CALC MV VTI: 33.3 CM
DOP CALCLVOT PEAK VEL VTI: 21.2 CM
E WAVE DECELERATION TIME: 180 MSEC
E/A RATIO: 1.54
E/E' RATIO: 8 M/S
ECHO LV POSTERIOR WALL: 0.9 CM (ref 0.6–1.1)
EST. AVERAGE GLUCOSE BLD GHB EST-MCNC: 111.2 MG/DL
FRACTIONAL SHORTENING: 22.9 % (ref 28–44)
HBA1C MFR BLD: 5.5 %
INTERVENTRICULAR SEPTUM: 0.8 CM (ref 0.6–1.1)
LEFT ATRIUM AREA SYSTOLIC (APICAL 2 CHAMBER): 13.9 CM2
LEFT ATRIUM AREA SYSTOLIC (APICAL 4 CHAMBER): 19.4 CM2
LEFT ATRIUM SIZE: 3.6 CM
LEFT ATRIUM VOLUME INDEX MOD: 23 ML/M2
LEFT ATRIUM VOLUME MOD: 44 ML
LEFT INTERNAL DIMENSION IN SYSTOLE: 3.7 CM (ref 2.1–4)
LEFT VENTRICLE DIASTOLIC VOLUME INDEX: 55.96 ML/M2
LEFT VENTRICLE DIASTOLIC VOLUME: 108 ML
LEFT VENTRICLE END DIASTOLIC VOLUME APICAL 2 CHAMBER: 42.8 ML
LEFT VENTRICLE END DIASTOLIC VOLUME APICAL 4 CHAMBER: 60.7 ML
LEFT VENTRICLE END SYSTOLIC VOLUME APICAL 2 CHAMBER: 31.1 ML
LEFT VENTRICLE END SYSTOLIC VOLUME APICAL 4 CHAMBER: 52 ML
LEFT VENTRICLE MASS INDEX: 71 G/M2
LEFT VENTRICLE SYSTOLIC VOLUME INDEX: 29 ML/M2
LEFT VENTRICLE SYSTOLIC VOLUME: 56 ML
LEFT VENTRICULAR INTERNAL DIMENSION IN DIASTOLE: 4.8 CM (ref 3.5–6)
LEFT VENTRICULAR MASS: 137.1 G
LV LATERAL E/E' RATIO: 6.7 M/S
LV SEPTAL E/E' RATIO: 10 M/S
LVED V (TEICH): 108 ML
LVES V (TEICH): 56.3 ML
LVOT MG: 2 MMHG
LVOT MV: 0.59 CM/S
MV MEAN GRADIENT: 1 MMHG
MV PEAK A VEL: 0.52 M/S
MV PEAK E VEL: 0.8 M/S
MV PEAK GRADIENT: 4 MMHG
MV STENOSIS PRESSURE HALF TIME: 67 MS
MV VALVE AREA BY CONTINUITY EQUATION: 1.62 CM2
MV VALVE AREA P 1/2 METHOD: 3.28 CM2
OHS LV EJECTION FRACTION SIMPSONS BIPLANE MOD: 55 %
PISA TR MAX VEL: 1.8 M/S
PV PEAK GRADIENT: 3 MMHG
PV PEAK VELOCITY: 0.93 M/S
RA PRESSURE ESTIMATED: 3 MMHG
RV TB RVSP: 5 MMHG
TDI LATERAL: 0.12 M/S
TDI SEPTAL: 0.08 M/S
TDI: 0.1 M/S
TR MAX PG: 13 MMHG
TRICUSPID ANNULAR PLANE SYSTOLIC EXCURSION: 2.7 CM
TV REST PULMONARY ARTERY PRESSURE: 16 MMHG
Z-SCORE OF LEFT VENTRICULAR DIMENSION IN END DIASTOLE: -1.3
Z-SCORE OF LEFT VENTRICULAR DIMENSION IN END SYSTOLE: 0.75

## 2025-06-14 PROCEDURE — 97161 PT EVAL LOW COMPLEX 20 MIN: CPT

## 2025-06-14 PROCEDURE — 97165 OT EVAL LOW COMPLEX 30 MIN: CPT

## 2025-06-14 PROCEDURE — 25500020 PHARM REV CODE 255

## 2025-06-14 PROCEDURE — 83036 HEMOGLOBIN GLYCOSYLATED A1C: CPT | Performed by: STUDENT IN AN ORGANIZED HEALTH CARE EDUCATION/TRAINING PROGRAM

## 2025-06-14 PROCEDURE — 99223 1ST HOSP IP/OBS HIGH 75: CPT | Mod: ,,, | Performed by: PSYCHIATRY & NEUROLOGY

## 2025-06-14 PROCEDURE — 21400001 HC TELEMETRY ROOM

## 2025-06-14 PROCEDURE — 25000003 PHARM REV CODE 250: Performed by: STUDENT IN AN ORGANIZED HEALTH CARE EDUCATION/TRAINING PROGRAM

## 2025-06-14 PROCEDURE — 25000003 PHARM REV CODE 250

## 2025-06-14 PROCEDURE — 36415 COLL VENOUS BLD VENIPUNCTURE: CPT | Performed by: STUDENT IN AN ORGANIZED HEALTH CARE EDUCATION/TRAINING PROGRAM

## 2025-06-14 PROCEDURE — 51700 IRRIGATION OF BLADDER: CPT

## 2025-06-14 PROCEDURE — 99283 EMERGENCY DEPT VISIT LOW MDM: CPT | Mod: ,,, | Performed by: STUDENT IN AN ORGANIZED HEALTH CARE EDUCATION/TRAINING PROGRAM

## 2025-06-14 PROCEDURE — A9577 INJ MULTIHANCE: HCPCS

## 2025-06-14 PROCEDURE — 63600175 PHARM REV CODE 636 W HCPCS: Performed by: STUDENT IN AN ORGANIZED HEALTH CARE EDUCATION/TRAINING PROGRAM

## 2025-06-14 RX ORDER — ATORVASTATIN CALCIUM 40 MG/1
40 TABLET, FILM COATED ORAL NIGHTLY
Status: DISCONTINUED | OUTPATIENT
Start: 2025-06-14 | End: 2025-06-16 | Stop reason: HOSPADM

## 2025-06-14 RX ADMIN — GADOBENATE DIMEGLUMINE 19 ML: 529 INJECTION, SOLUTION INTRAVENOUS at 11:06

## 2025-06-14 RX ADMIN — LISINOPRIL 5 MG: 2.5 TABLET ORAL at 08:06

## 2025-06-14 RX ADMIN — ENOXAPARIN SODIUM 40 MG: 40 INJECTION SUBCUTANEOUS at 04:06

## 2025-06-14 RX ADMIN — ESCITALOPRAM OXALATE 20 MG: 10 TABLET ORAL at 08:06

## 2025-06-14 RX ADMIN — GABAPENTIN 300 MG: 300 CAPSULE ORAL at 08:06

## 2025-06-14 RX ADMIN — ATORVASTATIN CALCIUM 40 MG: 40 TABLET, FILM COATED ORAL at 08:06

## 2025-06-14 RX ADMIN — IOHEXOL 75 ML: 350 INJECTION, SOLUTION INTRAVENOUS at 05:06

## 2025-06-14 RX ADMIN — ASPIRIN 81 MG: 81 TABLET, COATED ORAL at 08:06

## 2025-06-14 RX ADMIN — PRAVASTATIN SODIUM 40 MG: 40 TABLET ORAL at 08:06

## 2025-06-14 NOTE — CONSULTS
Re RUE numbness    Hpi  Acute onset neck pain/RUE numbness/confusion  Lasted 30 min  All sx have resolved  MRI negative for stroke  Not on asa at home  Nonsmoker  Past Medical History:   Diagnosis Date    GERD (gastroesophageal reflux disease)     Hyperlipidemia     Hypertension     Personal history of colonic polyps 08/22/2018     Social History[1]  Past Surgical History:   Procedure Laterality Date    COLONOSCOPY  08/22/2018    Damien Valverde MD    TONSILLECTOMY       The remainder of the 14 system ROS is noncontributory or negative unless mentioned/reviewed above.  Vitals:    06/14/25 1143   BP: 130/69   Pulse: (!) 58   Resp:    Temp: 97.8 °F (36.6 °C)     Scheduled Meds:   aspirin  81 mg Oral Daily    atorvastatin  40 mg Oral QHS    enoxparin  40 mg Subcutaneous Daily    EScitalopram oxalate  20 mg Oral Daily    gabapentin  300 mg Oral QHS    lisinopriL  5 mg Oral Daily     Continuous Infusions:  PRN Meds:.  Current Facility-Administered Medications:     acetaminophen, 650 mg, Oral, Q4H PRN    albuterol-ipratropium, 3 mL, Nebulization, Q6H PRN    aluminum-magnesium hydroxide-simethicone, 30 mL, Oral, QID PRN    bisacodyL, 10 mg, Rectal, Daily PRN    dextrose 50%, 12.5 g, Intravenous, PRN    dextrose 50%, 25 g, Intravenous, PRN    glucagon (human recombinant), 1 mg, Intramuscular, PRN    glucose, 16 g, Oral, PRN    glucose, 24 g, Oral, PRN    hydrALAZINE, 10 mg, Intravenous, Q6H PRN    HYDROcodone-acetaminophen, 1 tablet, Oral, Q6H PRN    melatonin, 9 mg, Oral, Nightly PRN    morphine, 2 mg, Intravenous, Q6H PRN    naloxone, 0.02 mg, Intravenous, PRN    ondansetron, 8 mg, Oral, Q8H PRN    ondansetron, 4 mg, Intravenous, Q8H PRN    polyethylene glycol, 17 g, Oral, TID PRN    simethicone, 1 tablet, Oral, QID PRN    sodium chloride 0.9%, 10 mL, Intravenous, PRN    Mental Status: Alert and oriented x3. Language is fluent with good comprehension.    Cranial Nerve: Pupils are equal, round, and reactive to light. Visual  fields are intact to confrontation. Ocular movements are intact. Face is symmetric at rest and with activation with intact sensation throughout. Hearing intact to finger rub bilaterally. Muscles of tongue and palate activate symmetrically. No dysarthria. Strength is full in sternocleidomastoid and trapezius bilaterally.    Motor: Muscle bulk and tone are normal. Strength is 5/5 in all four extremities both proximally and distally. Intact fine motor movements bilaterally. There is no pronator drift or satelliting on arm roll.    Sensory: Sensation is intact to light touch, pinprick, vibration, and proprioception throughout. Romberg is negative.    Reflexes: 2+ and symmetric at the biceps, triceps, brachioradialis, patella, and Achilles bilaterally. Plantar response is flexor bilaterally.    Coordination: No dysmetria on finger-nose-finger or heel-knee-shin. Normal rapid alternating movements. Fast finger tapping with normal amplitude and speed.    Gait: Not teseted    Mri brain negative  Mri c/L spine; degen changes    Cta no surgical vascular stenosis    Imp  Cervical radiculopathy  Asa daily; statin daily  No further recs from stroke standpoint  If surgery is planned, would wait 90 days to let the carotid issue settle down  Outpatient sleep study; pt reports snoring  Signing off       [1]   Social History  Socioeconomic History    Marital status:    Tobacco Use    Smoking status: Former     Current packs/day: 0.00     Types: Cigarettes     Quit date:      Years since quittin.4     Passive exposure: Past    Smokeless tobacco: Never   Substance and Sexual Activity    Alcohol use: Yes     Alcohol/week: 5.0 - 6.0 standard drinks of alcohol     Types: 5 - 6 Glasses of wine per week    Drug use: Never    Sexual activity: Not Currently     Social Drivers of Health     Financial Resource Strain: Low Risk  (2025)    Overall Financial Resource Strain (CARDI)     Difficulty of Paying Living  Expenses: Not hard at all   Food Insecurity: No Food Insecurity (6/14/2025)    Hunger Vital Sign     Worried About Running Out of Food in the Last Year: Never true     Ran Out of Food in the Last Year: Never true   Transportation Needs: No Transportation Needs (6/14/2025)    PRAPARE - Transportation     Lack of Transportation (Medical): No     Lack of Transportation (Non-Medical): No   Stress: No Stress Concern Present (6/14/2025)    [employer] [place] [employer] - Occupational Stress Questionnaire     Feeling of Stress : Not at all   Housing Stability: Low Risk  (6/14/2025)    Housing Stability Vital Sign     Unable to Pay for Housing in the Last Year: No     Homeless in the Last Year: No

## 2025-06-14 NOTE — SUBJECTIVE & OBJECTIVE
Prescriptions Prior to Admission[1]    Review of patient's allergies indicates:  No Known Allergies    Past Medical History:   Diagnosis Date    GERD (gastroesophageal reflux disease)     Hyperlipidemia     Hypertension     Personal history of colonic polyps 2018     Past Surgical History:   Procedure Laterality Date    COLONOSCOPY  2018    Damien Valverde MD    TONSILLECTOMY       Family History       Problem Relation (Age of Onset)    COPD Father    Cancer Mother          Tobacco Use    Smoking status: Former     Current packs/day: 0.00     Types: Cigarettes     Quit date:      Years since quittin.4     Passive exposure: Past    Smokeless tobacco: Never   Substance and Sexual Activity    Alcohol use: Yes     Alcohol/week: 5.0 - 6.0 standard drinks of alcohol     Types: 5 - 6 Glasses of wine per week    Drug use: Never    Sexual activity: Not Currently     Review of Systems 10-point ROS otherwise negative except above stated findings in HPI  Objective:     Weight: 95.2 kg (209 lb 14.1 oz)  Body mass index is 39.66 kg/m².  Vital Signs (Most Recent):  Temp: 98.2 °F (36.8 °C) (25 1518)  Pulse: (!) 58 (25 1518)  Resp: 18 (25 2338)  BP: 123/70 (25 1518)  SpO2: 97 % (25 1518) Vital Signs (24h Range):  Temp:  [97.4 °F (36.3 °C)-98.2 °F (36.8 °C)] 98.2 °F (36.8 °C)  Pulse:  [52-64] 58  Resp:  [13-20] 18  SpO2:  [95 %-99 %] 97 %  BP: ()/(59-86) 123/70     Date 25 0700 - 06/15/25 0659   Shift 9533-7513 2726-5040 1133-7859 24 Hour Total   INTAKE   Shift Total(mL/kg)       OUTPUT   Urine(mL/kg/hr) 1(0)   1   Shift Total(mL/kg) 1(0)   1(0)   Weight (kg) 95.2 95.2 95.2 95.2     Physical Exam   Neurosurgery Physical Exam  General: AOx3, GCS [id number]  CNII-XII: Intact on detailed exam, PERRL, visual fields grossly intact, EOMi, facial sensation preserved, no facial assymetry, tongue/uvula/palate midline, shoulder shrug equal, no pronator drift  Extremities: 5/5 motor  "throughout, sensorium intact throughout, coordination intact throughout, DTRs 2+, no pathological reflexes, no sensory level present    Significant Labs:  Recent Labs   Lab 06/13/25  1652         K 3.8      CO2 26   BUN 16.5   CREATININE 0.90   CALCIUM 8.9   MG 1.90     Recent Labs   Lab 06/13/25  1652   WBC 6.56   HGB 13.1   HCT 40.8        Recent Labs   Lab 06/13/25  1652   INR 0.9   APTT 28.1     Microbiology Results (last 7 days)       ** No results found for the last 168 hours. **          ABGs: No results for input(s): "PH", "PCO2", "PO2", "HCO3", "POCSATURATED", "BE" in the last 48 hours.  Cardiac markers:   Recent Labs   Lab 06/13/25  1652   TROPONINI <0.010     CMP:   Recent Labs   Lab 06/13/25  1652      CALCIUM 8.9   ALBUMIN 3.9   PROT 7.5      K 3.8   CO2 26      BUN 16.5   CREATININE 0.90   ALKPHOS 90   ALT 16   AST 16   BILITOT 0.5     CRP: No results for input(s): "CRP" in the last 48 hours.  ESR: No results for input(s): "POCESR", "ERYTHROCYTES" in the last 48 hours.  LFTs:   Recent Labs   Lab 06/13/25  1652   ALT 16   AST 16   ALKPHOS 90   BILITOT 0.5   PROT 7.5   ALBUMIN 3.9     Procalcitonin: No results for input(s): "PROCAL" in the last 48 hours.    Significant Diagnostics:  I have reviewed all pertinent imaging results/findings within the past 24 hours.       [1]   Medications Prior to Admission   Medication Sig Dispense Refill Last Dose/Taking    EScitalopram oxalate (LEXAPRO) 20 MG tablet TAKE ONE TABLET BY MOUTH ONCE DAILY 30 tablet 11 6/12/2025 Bedtime    lisinopriL (PRINIVIL,ZESTRIL) 5 MG tablet TAKE ONE TABLET BY MOUTH ONCE DAILY 90 tablet 3 6/12/2025 Bedtime    omeprazole (PRILOSEC) 20 MG capsule Take 20 mg by mouth once daily.   6/12/2025 Bedtime    pravastatin (PRAVACHOL) 40 MG tablet TAKE ONE (1) TABLET ORALLY ONCE A DAY.  WANTS YOU TO MAKE AN APPT BEFORE ANYMORE REFILLS CAN BE GIVEN 30 tablet 1 6/12/2025 Bedtime    aspirin (ECOTRIN) 81 " MG EC tablet Take 81 mg by mouth once daily.       azelastine (ASTELIN) 137 mcg (0.1 %) nasal spray 1 spray (137 mcg total) by Nasal route 2 (two) times daily. 3 mL 6     clotrimazole-betamethasone 1-0.05% (LOTRISONE) cream Apply topically 2 (two) times daily. 15 g 0     estradioL (ESTRACE) 0.01 % (0.1 mg/gram) vaginal cream Place 1 g vaginally once daily. (Patient not taking: Reported on 6/25/2024) 30 g 11     gabapentin (NEURONTIN) 300 MG capsule TAKE ONE CAPSULE BY MOUTH EACH EVENING 90 capsule 1     meclizine (ANTIVERT) 25 mg tablet Take 1 tablet (25 mg total) by mouth 3 (three) times daily as needed for Dizziness. 90 tablet 1

## 2025-06-14 NOTE — HPI
64F w/ PMH HTN, HLD who presented to [facility] ED with right arm numbness and speech difficulty for rule-out stroke; found to have back pain and concern for subacute cauda equina syndrome. Patient states she had a transient episode of right arm numbness and speech difficulty yesterday that spontaneously resolved prior to arrival at the ED. MRI brain was completed that showed no concern for stroke and she was started on ASA 81 with stroke neurology consult. On further history taking it was discovered that she has chronic back pain with several weeks of intermittent urinary incontinence and intermittent saddle anesthesia. She notes that every few days she will experience urinary incontinence with no warning and sometimes she cannot feel it when she wipes. MRI cervical spine showed age-related degenerative changes. MRI lumbar spine demonstrated degenerative changes but severe stenosis at L4/5. She states that her back pain is worse with standing and radiates into her hips and thighs; improved when sitting/lying down. She denies bowel incontinence, dropping things, balance issues or difficulty with fine motor tasks. She is currently on ASA 81 for her TIA.

## 2025-06-14 NOTE — H&P
[facility] HOSPITAL MEDICINE - H&P ADMISSION NOTE      Patient Name: Jasmyne Bone  MRN: [id number]  Patient Class: OP- Observation   Admission Date: 6/13/2025   Admitting Physician: YOJANA Service   Attending Physician: Thairy G Reyes, DO  Primary Care Provider: Cassia Lugo NP  Face-to-Face encounter date: 06/13/2025        CHIEF COMPLAINT     Chief Complaint   Patient presents with    Numbness     Intermittent numbness to right arm with expressive aphasia that started an hour ago but has resolved prior to arrival. Pt also c/o headache and low heart rate. Seen cardiology 2 days ago and scheduled for a halter monitor June 24th. Pt also c/o neck pain that started 3 days ago.    Headache    Bradycardia       HISTORY OF PRESENTING ILLNESS   64-year-old female with a past medical history of hypertension, hyperlipidemia who presents with complaint of right arm numbness that started a proximally 2:00 p.m. on day of arrival with associated difficulty word finding.  Patient also complains of worsening urinary incontinence and affirms to feeling numb when she wipes after going to the bathroom, symptoms have been ongoing for the last 2 weeks.  She has also been struggling with increased fatigued and has low tolerance for activities.    At baseline patient lives  with her grandchildren and is independent.    PAST MEDICAL HISTORY     Past Medical History:   Diagnosis Date    GERD (gastroesophageal reflux disease)     Hyperlipidemia     Hypertension     Personal history of colonic polyps 08/22/2018       PAST SURGICAL HISTORY     Past Surgical History:   Procedure Laterality Date    COLONOSCOPY  08/22/2018    Damien Valverde MD    TONSILLECTOMY         FAMILY HISTORY   Reviewed and noncontributory to this case    SOCIAL HISTORY   Social History[1]    HOME MEDICATIONS     Prior to Admission medications    Medication Sig Start Date End Date Taking? Authorizing Provider   aspirin (ECOTRIN) 81 MG EC tablet  Take 81 mg by mouth once daily.    Provider, Historical   azelastine (ASTELIN) 137 mcg (0.1 %) nasal spray 1 spray (137 mcg total) by Nasal route 2 (two) times daily. 3/25/24 3/25/25  Cassia Lugo NP   clotrimazole-betamethasone 1-0.05% (LOTRISONE) cream Apply topically 2 (two) times daily. 1/27/25   Cassia Lugo NP   EScitalopram oxalate (LEXAPRO) 20 MG tablet TAKE ONE TABLET BY MOUTH ONCE DAILY 9/16/24   Cassia Lugo NP   estradioL (ESTRACE) 0.01 % (0.1 mg/gram) vaginal cream Place 1 g vaginally once daily.  Patient not taking: Reported on 6/25/2024 10/13/23 10/12/24  Cassia Lugo NP   gabapentin (NEURONTIN) 300 MG capsule TAKE ONE CAPSULE BY MOUTH EACH EVENING 6/13/24   Cassia uLgo NP   lisinopriL (PRINIVIL,ZESTRIL) 5 MG tablet TAKE ONE TABLET BY MOUTH ONCE DAILY 9/16/24   Cassia Lugo NP   meclizine (ANTIVERT) 25 mg tablet Take 1 tablet (25 mg total) by mouth 3 (three) times daily as needed for Dizziness. 10/13/23 12/12/23  Cassia Lugo NP   omeprazole (PRILOSEC) 20 MG capsule Take 20 mg by mouth once daily.    Provider, Historical   pravastatin (PRAVACHOL) 40 MG tablet TAKE ONE (1) TABLET ORALLY ONCE A DAY.  WANTS YOU TO MAKE AN APPT BEFORE ANYMORE REFILLS CAN BE GIVEN 6/10/25   Cassia Lugo NP       ALLERGIES   Patient has no known allergies.    REVIEW OF SYSTEMS   Except as documented above, all other systems reviewed and negative    PHYSICAL EXAM     Vitals:    06/13/25 2014   BP: 135/68   Pulse: (!) 52   Resp:    Temp: 97.9 °F (36.6 °C)      General:  obese  Head and neck:  Atraumatic, normocephalic, moist mucous membranes, supple neck  Chest:  Clear to auscultation bilaterally  Heart:  S1, S2, no appreciable murmur  Abdomen:  Soft, nontender, BS +  MSK:  Warm, no lower extremity edema, no clubbing or cyanosis  Neuro:  Alert and oriented x4, moving all extremities with good strength  ASSESSMENT AND PLAN   TIA   -CT head with no acute findings, CTA with no large vessel occlusion, MRI  brain with small-vessel ischemic changes  -Preliminary findings from carotid ultrasound show no significant plaques  -continue with aspirin, statin    Symptomatic bradycardia ?   -patient has complains of poor tolerance performing activities of daily living   -follow echocardiogram   -perhaps bradycardia is stability of her symptoms however review of her heart rate on her Apple watch shows that she ranges from 50-65 at rest, no lower   -follows with Dr. Laura     Lumbar stenosis ?  -with the associated saddle anesthesia and incontinence for the last 2 weeks  -pending CT lumbar spine can follow with MRI if needed  -PT/OT    DVT prophylaxis:  Lovenox  Screening for Social Drivers for health:  Patient screened for food insecurity, housing instability, transportation needs, utility difficulties, and interpersonal safety (select all that apply as identified as concern)  []Housing or Food  []Transportation Needs  []Utility Difficulties  []Interpersonal safety  [x]None  __________________________________________________________________  LABS/MICRO/MEDS/DIAGNOSTICS       LABS  Recent Labs     06/13/25  1652      K 3.8   CO2 26   BUN 16.5   CREATININE 0.90   CALCIUM 8.9   ALKPHOS 90   AST 16   ALT 16   ALBUMIN 3.9     Recent Labs     06/13/25  1652   WBC 6.56   RBC 4.69   HCT 40.8   MCV 87.0          MICROBIOLOGY  Microbiology Results (last 7 days)       ** No results found for the last 168 hours. **            MEDICATIONS   aspirin  325 mg Oral Once    [START ON 6/14/2025] aspirin  81 mg Oral Daily    [START ON 6/14/2025] enoxparin  40 mg Subcutaneous Daily      INFUSIONS      DIAGNOSTIC TESTS  CT Head Without Contrast   Final Result      No acute intracranial abnormality.         Electronically signed by: Keri Shetty   Date:    06/13/2025   Time:    17:21      CTA Head and Neck (xpd)    (Results Pending)   MRI Brain Without Contrast    (Results Pending)          Patient information was obtained from  patient, patient's family, past medical records and ER records.   All diagnosis and differential diagnosis have been reviewed; assessment and plan has been documented. I have personally reviewed the labs and test results that are presently available; I have reviewed the patients medication list. I have reviewed the consulting providers response and recommendations. I have reviewed or attempted to review medical records based upon their availability.  All of the patient's questions have been addressed and answered. Patient's is agreeable to the above stated plan. I will continue to monitor closely and make adjustments to medical management as needed.  This note was created using Nanameue voice recognition software that occasionally misinterpreted phrases or words.  Please contact me if any questions may rise regarding documentation to clarify verbiage.        Thairy G Reyes,    Internal Medicine  Department of Hospital Medicine  [facility]         [1]   Social History  Socioeconomic History    Marital status:    Tobacco Use    Smoking status: Former     Current packs/day: 0.00     Types: Cigarettes     Quit date:      Years since quittin.4     Passive exposure: Past    Smokeless tobacco: Never   Substance and Sexual Activity    Alcohol use: Yes     Alcohol/week: 5.0 - 6.0 standard drinks of alcohol     Types: 5 - 6 Glasses of wine per week    Drug use: Never    Sexual activity: Not Currently

## 2025-06-14 NOTE — PT/OT/SLP EVAL
Occupational Therapy  Evaluation    Name: Jasmyne Bone  MRN: [id number]  Recent Surgery: * No surgery found *      Recommendations:     Discharge therapy intensity: No Therapy Indicated   Discharge Equipment Recommendations:  none  Barriers to discharge:  None    Assessment:     Jasmyne Bone is a 64 y.o. female with a medical diagnosis of R arm numbness 2/2 cervical rediculopathy vs. TIA, severe canal stenosis of L4-5, and sinus bradycardia.  She presents with no performance deficits affecting function and is at functional baseline with all ADLs and functional mobility/trasnfers. Patient is a great candidate for outpatient physical therapy services to address higher level balance, reduce risk for falls, and address cervical neck discomfort.       Plan:     Patient to be seen   to address the above listed problems via    Plan of Care Expires: 06/14/25  Plan of Care Reviewed with: patient, daughter    Subjective     Chief Complaint: chronic back discomfort/pain, intermittent B LE numbness  Patient/Family Comments/goals: return home    Occupational Profile:  Living Environment: [facility] with grandchildren  Previous level of function: independent ADLs and mobility  Equipment Used at Home: none  Assistance upon Discharge: grandchildren to assist as needed     Pain/Comfort:  Pain Rating 1: 0/10    Patients cultural, spiritual, Alevism conflicts given the current situation: no    Objective:     OT communicated with RN prior to session.      Patient was found supine with bed alarm, blood pressure cuff, pulse ox (continuous), telemetry upon OT entry to room.    General Precautions: Standard, fall  Orthopedic Precautions: N/A  Braces: N/A      Functional Mobility/Transfers:  Bed mobility:    Supine to Sit: independence  Sit to Supine: independence  Transfers: Sit to Stand: independence with no AD  Toilet Transfer: independence with no AD using Step Transfer  Functional Mobility: Patient complete functional  mobility within hospital hallway without assistive device independently; refer to PT note for distance.     Activities of Daily Living:  Lower Body Dressing: independence don B  socks seated EOB    AMPAC 6 Click ADL:  AMPAC Total Score: 24    Functional Cognition:  Intact    Visual Perceptual Skills:  Intact    Upper Extremity Function:  Right Upper Extremity:   WFL    Left Upper Extremity:  WFL    Balance:   Intact    Therapeutic Positioning  Risk for acquired pressure injuries is decreased due to ability to get to BSC/toilet with assist, intact sensation, continence, and ability to mobilize independently .    Co-Treatment: Yes, due to initial eval     Patient Education:  Patient provided with verbal education education regarding fall prevention and home exercise program .  Understanding was verbalized.     Patient left supine with all lines intact, call button in reach, and daughter and transporter present.    GOALS:   Multidisciplinary Problems       Occupational Therapy Goals       Not on file                    History:     Past Medical History:   Diagnosis Date    GERD (gastroesophageal reflux disease)     Hyperlipidemia     Hypertension     Personal history of colonic polyps 08/22/2018         Past Surgical History:   Procedure Laterality Date    COLONOSCOPY  08/22/2018    Damien Valverde MD    TONSILLECTOMY         Time Tracking:     OT Date of Treatment: 06/14/25  OT Start Time: 1001  OT Stop Time: 1011  OT Total Time (min): 10 min    Billable Minutes:Evaluation 10    6/14/2025

## 2025-06-14 NOTE — PROGRESS NOTES
[facility] Medicine Progress Note        Chief Complaint: Inpatient Follow-up     HPI:   64-year-old female with a past medical history of hypertension, hyperlipidemia who presents with complaint of right arm numbness that started a proximally 2:00 p.m. on day of arrival with associated difficulty word finding.  Patient also complains of worsening urinary incontinence and affirms to feeling numb when she wipes after going to the bathroom, symptoms have been ongoing for the last 2 weeks.  She has also been struggling with increased fatigued and has low tolerance for activities.     At baseline patient lives  with her grandchildren and is independent.    Interval Hx:   Seen and examined this morning with her daughter at bedside. She endorses the numbness has gone but reports saddle anesthesia and urinary incontinence for 2 weeks. No headaches. Has occasional neck pain. Occasional weakness in her legs but not that the moment.     Case was discussed with patient's nurse and  on the floor.    Objective/physical exam:  General: In no acute distress, afebrile  Chest: Clear to auscultation bilaterally  Heart: RRR, +S1, S2, no appreciable murmur  Abdomen: Soft, nontender, BS +  MSK: Warm, no lower extremity edema, no clubbing or cyanosis  Neurologic: Alert and oriented x4, Cranial nerve II-XII intact, Strength 5/5 in all 4 extremities    VITAL SIGNS: 24 HRS MIN & MAX LAST   Temp  Min: 97.4 °F (36.3 °C)  Max: 98.2 °F (36.8 °C) 97.4 °F (36.3 °C)   BP  Min: 97/60  Max: 160/86 110/61   Pulse  Min: 52  Max: 64  62   Resp  Min: 13  Max: 20 18   SpO2  Min: 95 %  Max: 99 % 98 %     I have reviewed the following labs:  Recent Labs   Lab 06/13/25  1652   WBC 6.56   RBC 4.69   HGB 13.1   HCT 40.8   MCV 87.0   MCH 27.9   MCHC 32.1*   RDW 13.2      MPV 10.5*     Recent Labs   Lab 06/13/25  1652      K 3.8      CO2 26   BUN 16.5   CREATININE 0.90      CALCIUM  8.9   MG 1.90   ALBUMIN 3.9   PROT 7.5   ALKPHOS 90   ALT 16   AST 16   BILITOT 0.5     Microbiology Results (last 7 days)       ** No results found for the last 168 hours. **             See below for Radiology    Assessment/Plan:  Right arm numbess 2/2 cervical radiculopathy vs TIA  - CT head with no acute findings, CTA with no large vessel occlusion, MRI brain with small-vessel ischemic changes  - Preliminary findings from carotid ultrasound show no significant plaques  - continue with aspirin, statin. Change to atorvastatin  - will consult neurology  - will obtain MRI C spine    Severe canal stenosis on L4-L5  - with the associated saddle anesthesia and incontinence for the last 2 weeks  - I am ordering a STAT MRI L spine  - will consult neurosurgery for concerns of cord compresion  -PT/OT     Sinus Bradycardia  -patient has complains of poor tolerance performing activities of daily living   -follow echocardiogram   -perhaps bradycardia is stability of her symptoms however review of her heart rate on her Apple watch shows that she ranges from 50-65 at rest, no lower   -follows with Dr. Laura   - will monitor on tele for now    Morbid obesity  - advised on diet and lifestyle    Hx of HTN and HLD       VTE prophylaxis: lovenox    Patient condition:  Fair    Anticipated discharge and Disposition:         All diagnosis and differential diagnosis have been reviewed; assessment and plan has been documented; I have personally reviewed the labs and test results that are presently available; I have reviewed the patients medication list; I have reviewed the consulting providers response and recommendations. I have reviewed or attempted to review medical records based upon their availability    All of the patient's questions have been  addressed and answered. Patient's is agreeable to the above stated plan. I will continue to monitor closely and make adjustments to medical management as  needed.    _____________________________________________________________________    Malnutrition Status:  Nutrition consulted. Most recent weight and BMI monitored-     Measurements:  Wt Readings from Last 1 Encounters:   06/14/25 95.2 kg (209 lb 14.1 oz)   Body mass index is 39.66 kg/m².    Patient has been screened and assessed by RD.    Malnutrition Type:  Context:    Level:      Malnutrition Characteristic Summary:       Interventions/Recommendations (treatment strategy):        Scheduled Med:   aspirin  81 mg Oral Daily    enoxparin  40 mg Subcutaneous Daily    EScitalopram oxalate  20 mg Oral Daily    gabapentin  300 mg Oral QHS    lisinopriL  5 mg Oral Daily    pravastatin  40 mg Oral Daily      Continuous Infusions:     PRN Meds:    Current Facility-Administered Medications:     acetaminophen, 650 mg, Oral, Q4H PRN    albuterol-ipratropium, 3 mL, Nebulization, Q6H PRN    aluminum-magnesium hydroxide-simethicone, 30 mL, Oral, QID PRN    bisacodyL, 10 mg, Rectal, Daily PRN    dextrose 50%, 12.5 g, Intravenous, PRN    dextrose 50%, 25 g, Intravenous, PRN    glucagon (human recombinant), 1 mg, Intramuscular, PRN    glucose, 16 g, Oral, PRN    glucose, 24 g, Oral, PRN    hydrALAZINE, 10 mg, Intravenous, Q6H PRN    HYDROcodone-acetaminophen, 1 tablet, Oral, Q6H PRN    melatonin, 9 mg, Oral, Nightly PRN    morphine, 2 mg, Intravenous, Q6H PRN    naloxone, 0.02 mg, Intravenous, PRN    ondansetron, 8 mg, Oral, Q8H PRN    ondansetron, 4 mg, Intravenous, Q8H PRN    polyethylene glycol, 17 g, Oral, TID PRN    simethicone, 1 tablet, Oral, QID PRN    sodium chloride 0.9%, 10 mL, Intravenous, PRN     Radiology:  I have personally reviewed the following imaging and agree with the radiologist.     CT Lumbar Spine With Contrast  Narrative: EXAMINATION:  CT LUMBAR SPINE WITH CONTRAST    CLINICAL HISTORY:  Spinal stenosis, lumbar;    TECHNIQUE:  Postcontrast CT images of the lumbar spine. Axial, coronal, and sagittal  reformatted images were obtained. Dose length product is 1193 mGycm. Automatic exposure control, adjustment of mA/kV or iterative reconstruction technique was used to limit radiation dose.    COMPARISON:  X-rays dated 11/01/2023    FINDINGS:  There are 5 non-rib-bearing lumbar type vertebral bodies.  There is grade 1 anterolisthesis of L4 over L5 and L5-S1.  The vertebral body heights are maintained.  There is no acute fracture identified.  There is mild disc height loss at L1-L3 and L5-S1 with moderate multilevel marginal osteophytes.  There is moderate to severe facet arthropathy in the lower lumbar spine.  There is likely severe canal stenosis at L4-5 with disc bulge and facet hypertrophy.  There is likely moderate canal narrowing at L1 through L4 with disc bulge and facet hypertrophy.  There is moderate neural foraminal narrowing bilaterally at L4-5, severe on the right at L5-S1.  The paraspinal soft tissues are unremarkable.  Impression: 1. No acute fracture identified.  2. Multilevel degenerative changes of the lumbar spine with likely severe canal stenosis at L4-5.    Electronically signed by: Keri Shetty  Date:    06/14/2025  Time:    09:31  MRI Brain Without Contrast  Narrative: EXAMINATION:  MRI BRAIN WITHOUT CONTRAST    CLINICAL HISTORY:  Transient ischemic attack (TIA);Stroke, follow up;    TECHNIQUE:  Multiplanar, multisequence MR images of the brain were obtained without the administration of intravenous contrast.    COMPARISON:  CT head dated 06/13/2025    FINDINGS:  There is no restricted diffusion, hemorrhage or edema.  Mild scattered T2/FLAIR hyperintensities in the subcortical and periventricular white matter likely represent chronic microvascular ischemic changes.    There is no mass effect or midline shift.  The basal cisterns are patent.  The ventricles are normal in size.  There is no abnormal extra-axial fluid collection.  The major intracranial flow voids are patent.  The paranasal  sinuses are clear.  Impression: 1. No acute intracranial abnormality.  2. Mild chronic microvascular ischemic changes  No significant change from the [employer] interpretation.    Electronically signed by: Keri Shetty  Date:    06/14/2025  Time:    08:56  CTA Head and Neck (xpd)  Narrative: EXAMINATION:  CTA HEAD AND NECK (XPD)    CLINICAL HISTORY:  Stroke/TIA, determine embolic source;    TECHNIQUE:  Axial images of the head and neck were obtained with IV contrast administration.  Coronal and sagittal reconstructions were provided.  Three dimensional and MIP images were obtained and evaluated.  Total DLP was 1634 mGy-cm. Dose lowering technique and automated exposure control were utilized for this exam.The degree of carotid stenosis was evaluated using NASCET criteria.    COMPARISON:  MRI of the brain 06/13/2025.    FINDINGS:  Vascular findings:    There is a normal branching pattern of the aortic arch.  The brachiocephalic artery is normal.  The right common carotid artery is normal.  The right cervical ICA is normal.  The left common carotid artery is normal.  There is mixed density plaque at the left carotid bifurcation and proximal left ICA without flow limiting stenosis.  The remainder of the left cervical ICA is normal.  The dominant right and non dominant left vertebral arteries are normal throughout their cervical courses.  The intradural vertebral arteries are normal.  The basilar artery is normal.  There is a fetal origin of the bilateral PCA.  There are scattered calcifications in the cavernous carotids without flow limiting stenosis.  The rdusef-jy-Iuyyyb is intact.  There is no aneurysm.  There is no focal stenosis.  There is no vessel cut off.    Nonvascular findings:    There is no hemorrhage, hydrocephalus, or midline shift.  There is no abnormal intracranial enhancement.  The bilateral orbits are normal.  The paranasal sinuses are free of disease.  There nasopharynx and oropharynx are widely  patent.  The parotid glands are normal.  The thyroid gland is normal.  The lung apices are clear.  Impression: 1. Mixed density plaque in the left carotid bifurcation and proximal left ICA without flow limiting stenosis.  2. Calcification in the bilateral cavernous carotids without flow limiting stenosis.  [employer] concordance    Electronically signed by: Phani Shields MD  Date:    06/14/2025  Time:    08:42      Akosua Lui MD  Department of Hospital Medicine   [facility]   06/14/2025

## 2025-06-14 NOTE — CONSULTS
[facility] - Neurology  Neurosurgery  Consult Note    Consults  Subjective:     Chief Complaint/Reason for Admission: Low back pain with neurogenic claudication    History of Present Illness: 64F w/ PMH HTN, HLD who presented to [facility] ED with right arm numbness and speech difficulty for rule-out stroke; found to have back pain and concern for subacute cauda equina syndrome. Patient states she had a transient episode of right arm numbness and speech difficulty yesterday that spontaneously resolved prior to arrival at the ED. MRI brain was completed that showed no concern for stroke and she was started on ASA 81 with stroke neurology consult. On further history taking it was discovered that she has chronic back pain with several weeks of intermittent urinary incontinence and intermittent saddle anesthesia. She notes that every few days she will experience urinary incontinence with no warning and sometimes she cannot feel it when she wipes. MRI cervical spine showed age-related degenerative changes. MRI lumbar spine demonstrated degenerative changes but severe stenosis at L4/5. She states that her back pain is worse with standing and radiates into her hips and thighs; improved when sitting/lying down. She denies bowel incontinence, dropping things, balance issues or difficulty with fine motor tasks. She is currently on ASA 81 for her TIA.    Prescriptions Prior to Admission[1]    Review of patient's allergies indicates:  No Known Allergies    Past Medical History:   Diagnosis Date    GERD (gastroesophageal reflux disease)     Hyperlipidemia     Hypertension     Personal history of colonic polyps 08/22/2018     Past Surgical History:   Procedure Laterality Date    COLONOSCOPY  08/22/2018    Damien Valverde MD    TONSILLECTOMY       Family History       Problem Relation (Age of Onset)    COPD Father    Cancer Mother          Tobacco Use    Smoking status: Former     Current packs/day: 0.00     Types: Cigarettes  "    Quit date: 1980     Years since quittin.4     Passive exposure: Past    Smokeless tobacco: Never   Substance and Sexual Activity    Alcohol use: Yes     Alcohol/week: 5.0 - 6.0 standard drinks of alcohol     Types: 5 - 6 Glasses of wine per week    Drug use: Never    Sexual activity: Not Currently     Review of Systems 10-point ROS otherwise negative except above stated findings in HPI  Objective:     Weight: 95.2 kg (209 lb 14.1 oz)  Body mass index is 39.66 kg/m².  Vital Signs (Most Recent):  Temp: 98.2 °F (36.8 °C) (25 151)  Pulse: (!) 58 (25 151)  Resp: 18 (25 2338)  BP: 123/70 (25)  SpO2: 97 % (25) Vital Signs (24h Range):  Temp:  [97.4 °F (36.3 °C)-98.2 °F (36.8 °C)] 98.2 °F (36.8 °C)  Pulse:  [52-64] 58  Resp:  [13-20] 18  SpO2:  [95 %-99 %] 97 %  BP: ()/(59-86) 123/70     Date 25 0700 - 06/15/25 0659   Shift 4865-2419 6272-5217 9869-1360 24 Hour Total   INTAKE   Shift Total(mL/kg)       OUTPUT   Urine(mL/kg/hr) 1(0)   1   Shift Total(mL/kg) 1(0)   1(0)   Weight (kg) 95.2 95.2 95.2 95.2     Physical Exam   Neurosurgery Physical Exam  General: AOx3, GCS [id number]  CNII-XII: Intact on detailed exam, PERRL, visual fields grossly intact, EOMi, facial sensation preserved, no facial assymetry, tongue/uvula/palate midline, shoulder shrug equal, no pronator drift  Extremities: 5/5 motor throughout, sensorium intact throughout, coordination intact throughout, DTRs 2+, no pathological reflexes, no sensory level present    Significant Labs:  Recent Labs   Lab 25  1652         K 3.8      CO2 26   BUN 16.5   CREATININE 0.90   CALCIUM 8.9   MG 1.90     Recent Labs   Lab 25  1652   WBC 6.56   HGB 13.1   HCT 40.8        Recent Labs   Lab 25  1652   INR 0.9   APTT 28.1     Microbiology Results (last 7 days)       ** No results found for the last 168 hours. **          ABGs: No results for input(s): "PH", "PCO2", "PO2", " ""HCO3", "POCSATURATED", "BE" in the last 48 hours.  Cardiac markers:   Recent Labs   Lab 06/13/25  1652   TROPONINI <0.010     CMP:   Recent Labs   Lab 06/13/25  1652      CALCIUM 8.9   ALBUMIN 3.9   PROT 7.5      K 3.8   CO2 26      BUN 16.5   CREATININE 0.90   ALKPHOS 90   ALT 16   AST 16   BILITOT 0.5     CRP: No results for input(s): "CRP" in the last 48 hours.  ESR: No results for input(s): "POCESR", "ERYTHROCYTES" in the last 48 hours.  LFTs:   Recent Labs   Lab 06/13/25  1652   ALT 16   AST 16   ALKPHOS 90   BILITOT 0.5   PROT 7.5   ALBUMIN 3.9     Procalcitonin: No results for input(s): "PROCAL" in the last 48 hours.    Significant Diagnostics:  I have reviewed all pertinent imaging results/findings within the past 24 hours.    Assessment/Plan:     Lumbar stenosis with neurogenic claudication  64F w/ PMH HTN, HLD who presented to [facility] ED with right arm numbness and speech difficulty for rule-out stroke; found to have back pain and concern for subacute cauda equina syndrome.     --Patient admitted to medicine  --All labs and diagnostics reviewed  -MRI brain showed no concern for stroke  -MRI cervical spine showed age-related degenerative changes  -MRI lumbar spine demonstrated degenerative changes but severe stenosis at L4/5  --Recommend PVR to assess for urinary retention  --Given subacute nature of neurologic symptoms but concern for subacute cauda equina syndrome, patient would likely benefit from decompressive surgery; timing and planning pending as patient would require being off aspirin for 48h prior and 5-7 days after surgery  --Pain medications per primary team  --Rest of care per primary team  --Please call NSGY with any acute exam changes        Thank you for your consult. I will follow-up with patient. Please contact us if you have any additional questions.    Morris Schmidt MD  Neurosurgery  [facility] - Neurology       [1]   Medications Prior to Admission "   Medication Sig Dispense Refill Last Dose/Taking    EScitalopram oxalate (LEXAPRO) 20 MG tablet TAKE ONE TABLET BY MOUTH ONCE DAILY 30 tablet 11 6/12/2025 Bedtime    lisinopriL (PRINIVIL,ZESTRIL) 5 MG tablet TAKE ONE TABLET BY MOUTH ONCE DAILY 90 tablet 3 6/12/2025 Bedtime    omeprazole (PRILOSEC) 20 MG capsule Take 20 mg by mouth once daily.   6/12/2025 Bedtime    pravastatin (PRAVACHOL) 40 MG tablet TAKE ONE (1) TABLET ORALLY ONCE A DAY.  WANTS YOU TO MAKE AN APPT BEFORE ANYMORE REFILLS CAN BE GIVEN 30 tablet 1 6/12/2025 Bedtime    aspirin (ECOTRIN) 81 MG EC tablet Take 81 mg by mouth once daily.       azelastine (ASTELIN) 137 mcg (0.1 %) nasal spray 1 spray (137 mcg total) by Nasal route 2 (two) times daily. 3 mL 6     clotrimazole-betamethasone 1-0.05% (LOTRISONE) cream Apply topically 2 (two) times daily. 15 g 0     estradioL (ESTRACE) 0.01 % (0.1 mg/gram) vaginal cream Place 1 g vaginally once daily. (Patient not taking: Reported on 6/25/2024) 30 g 11     gabapentin (NEURONTIN) 300 MG capsule TAKE ONE CAPSULE BY MOUTH EACH EVENING 90 capsule 1     meclizine (ANTIVERT) 25 mg tablet Take 1 tablet (25 mg total) by mouth 3 (three) times daily as needed for Dizziness. 90 tablet 1

## 2025-06-14 NOTE — PT/OT/SLP EVAL
Physical Therapy Evaluation and Discharge Note    Patient Name:  Jasmyne Bone   MRN:  [id number]    Recommendations:     Discharge therapy intensity: Low Intensity Therapy (outpatient)   Discharge Equipment Recommendations: none   Barriers to discharge: Ongoing medical needs    Assessment:     Jasmyne Bone is a 64 y.o. female admitted with a medical diagnosis of Right arm numbness 2/2 cervical radiculopathy vs TIA, Severe canal stenosis on L4-L5, Sinus Bradycardia.  At this time, patient is functioning at their prior level of function and does not require further acute PT services.     Recent Surgery: * No surgery found *      Plan:     During this hospitalization, patient does not require further acute PT services.  Please re-consult if situation changes.      Subjective     Chief Complaint: chronic back pain and intermittent B LE numbness  Patient/Family Comments/goals:   Pain/Comfort:  Location 1: back    Patients cultural, spiritual, Zoroastrian conflicts given the current situation:      Living Environment:  Pt lives with grandchildren  Prior to admission, patients level of function was ind, no AD for mobility.  Equipment used at home: none.  DME owned (not currently used): none.  Upon discharge, patient will have assistance from grandchildren.    Objective:     Communicated with RN prior to session.  Patient found HOB elevated with peripheral IV upon PT entry to room.    General Precautions: Standard, fall    Orthopedic Precautions:N/A   Braces: N/A  Respiratory Status: Room air  Blood Pressure:     Exams:  RLE ROM: WFL  RLE Strength: 4- to 4/5 grossly  LLE ROM: WFL  LLE Strength: 4 to 4+/5 grossly    Functional Mobility:  Bed Mobility:     Supine to Sit: independence  Transfers:     Sit to Stand:  independence with rolling walker  Gait: Pt ambulated ~160 ft in hallway w/ no AD, SBA. No unsteadiness or LOB    AM-PAC 6 CLICK MOBILITY  Total Score:23     Co-Treatment: Yes, due to initial  eval    Treatment and Education:  PT POC eval d/c, safe mobility, discussed OP PT to address chronic back pain    Patient left supine with all lines intact, call button in reach, and dtr and transport present.    GOALS:   Multidisciplinary Problems       Physical Therapy Goals       Not on file                    History:     Past Medical History:   Diagnosis Date    GERD (gastroesophageal reflux disease)     Hyperlipidemia     Hypertension     Personal history of colonic polyps 08/22/2018       Past Surgical History:   Procedure Laterality Date    COLONOSCOPY  08/22/2018    Damien Valverde MD    TONSILLECTOMY         Time Tracking:     PT Received On: 06/14/25  PT Start Time: 1003     PT Stop Time: 1013  PT Total Time (min): 10 min     Billable Minutes: Evaluation 10      06/14/2025

## 2025-06-14 NOTE — ASSESSMENT & PLAN NOTE
64F w/ PMH HTN, HLD who presented to [facility] ED with right arm numbness and speech difficulty for rule-out stroke; found to have back pain and concern for subacute cauda equina syndrome.     --Patient admitted to medicine  --All labs and diagnostics reviewed  -MRI brain showed no concern for stroke  -MRI cervical spine showed age-related degenerative changes  -MRI lumbar spine demonstrated degenerative changes but severe stenosis at L4/5  --Recommend PVR to assess for urinary retention  --Given subacute nature of neurologic symptoms but concern for subacute cauda equina syndrome, patient would likely benefit from decompressive surgery; timing and planning pending as patient would require being off aspirin for 48h prior and 5-7 days after surgery  --Pain medications per primary team  --Rest of care per primary team  --Please call NSGY with any acute exam changes

## 2025-06-15 PROCEDURE — 99231 SBSQ HOSP IP/OBS SF/LOW 25: CPT | Mod: ,,, | Performed by: STUDENT IN AN ORGANIZED HEALTH CARE EDUCATION/TRAINING PROGRAM

## 2025-06-15 PROCEDURE — 25000003 PHARM REV CODE 250: Performed by: STUDENT IN AN ORGANIZED HEALTH CARE EDUCATION/TRAINING PROGRAM

## 2025-06-15 PROCEDURE — 25000003 PHARM REV CODE 250

## 2025-06-15 PROCEDURE — 63600175 PHARM REV CODE 636 W HCPCS: Performed by: STUDENT IN AN ORGANIZED HEALTH CARE EDUCATION/TRAINING PROGRAM

## 2025-06-15 PROCEDURE — 21400001 HC TELEMETRY ROOM

## 2025-06-15 PROCEDURE — 99232 SBSQ HOSP IP/OBS MODERATE 35: CPT | Mod: ,,, | Performed by: PSYCHIATRY & NEUROLOGY

## 2025-06-15 RX ADMIN — ESCITALOPRAM OXALATE 20 MG: 10 TABLET ORAL at 08:06

## 2025-06-15 RX ADMIN — ASPIRIN 81 MG: 81 TABLET, COATED ORAL at 11:06

## 2025-06-15 RX ADMIN — LISINOPRIL 5 MG: 2.5 TABLET ORAL at 08:06

## 2025-06-15 RX ADMIN — ATORVASTATIN CALCIUM 40 MG: 40 TABLET, FILM COATED ORAL at 08:06

## 2025-06-15 RX ADMIN — ENOXAPARIN SODIUM 40 MG: 40 INJECTION SUBCUTANEOUS at 04:06

## 2025-06-15 RX ADMIN — GABAPENTIN 300 MG: 300 CAPSULE ORAL at 08:06

## 2025-06-15 NOTE — ASSESSMENT & PLAN NOTE
64F w/ PMH HTN, HLD who presented to [facility] ED with right arm numbness and speech difficulty for rule-out stroke; found to have back pain and concern for subacute cauda equina syndrome.     --Patient admitted to medicine  --All labs and diagnostics reviewed  -MRI brain showed no concern for stroke  -MRI cervical spine showed age-related degenerative changes  -MRI lumbar spine demonstrated degenerative changes but severe stenosis at L4/5   -PVR WNLs  --Given subacute nature of neurologic symptoms but concern for subacute cauda equina syndrome, patient would likely benefit from decompressive surgery; Stroke team recommending 7 days of ASA with no recurrent stroke symptoms prior to surgery. Discussed this with patient--that since her symptoms are not persistent, PVR is WNLs and her TIA is more urgent than her lumbar decompression we will schedule close clinic follow-up for surgery. Neurosurgery will sign-off at this time with close clinic follow-up as stated above.  --Pain medications per primary team  --Rest of care per primary team  --Please call/reconsult NSGY with any acute exam changes

## 2025-06-15 NOTE — PROGRESS NOTES
[facility]  Neurology  Neurosurgery  Progress Note    Subjective:     History of Present Illness: 64F w/ PMH HTN, HLD who [place] [facility] right arm numbness and speech difficulty for rule-out stroke; found to have back pain and concern for subacute cauda equina syndrome. Patient states she had a transient episode of right arm numbness and speech difficulty yesterday that spontaneously resolved prior to arrival at the ED. MRI brain was completed that showed no concern for stroke and she was started on ASA 81 with stroke neurology consult. On further history taking it was discovered that she has chronic back pain with several weeks of intermittent urinary incontinence and intermittent saddle anesthesia. She notes that every few days she will experience urinary incontinence with no warning and sometimes she cannot feel it when she wipes. MRI cervical spine showed age-related degenerative changes. MRI lumbar spine demonstrated degenerative changes but severe stenosis at L4/5. She states that her back pain is worse with standing and radiates into her hips and thighs; improved when sitting/lying down. She denies bowel incontinence, dropping things, balance issues or difficulty with fine motor tasks. She is currently on ASA 81 for her TIA.    Post-Op Info:  * No surgery found *       Interval History: NAEON, AFVSS, Exam stable. No events of incontinence or saddle anesthesia. Stroke team recommending 7 days of ASA with no recurrent stroke symptoms prior to surgery. Discussed this with patient--that since her symptoms are not persistent, PVR is WNLs and her TIA is more urgent than her lumbar decompression we will schedule close clinic follow-up for surgery.    Medications:  Continuous Infusions:  Scheduled Meds:   aspirin  81 mg Oral Daily    atorvastatin  40 mg Oral QHS    enoxparin  40 mg Subcutaneous Daily    EScitalopram oxalate  20 mg Oral Daily    gabapentin  300 mg Oral QHS    lisinopriL  5 mg  "Oral Daily     PRN Meds:  Current Facility-Administered Medications:     acetaminophen, 650 mg, Oral, Q4H PRN    albuterol-ipratropium, 3 mL, Nebulization, Q6H PRN    aluminum-magnesium hydroxide-simethicone, 30 mL, Oral, QID PRN    bisacodyL, 10 mg, Rectal, Daily PRN    dextrose 50%, 12.5 g, Intravenous, PRN    dextrose 50%, 25 g, Intravenous, PRN    glucagon (human recombinant), 1 mg, Intramuscular, PRN    glucose, 16 g, Oral, PRN    glucose, 24 g, Oral, PRN    hydrALAZINE, 10 mg, Intravenous, Q6H PRN    HYDROcodone-acetaminophen, 1 tablet, Oral, Q6H PRN    melatonin, 9 mg, Oral, Nightly PRN    morphine, 2 mg, Intravenous, Q6H PRN    naloxone, 0.02 mg, Intravenous, PRN    ondansetron, 8 mg, Oral, Q8H PRN    ondansetron, 4 mg, Intravenous, Q8H PRN    polyethylene glycol, 17 g, Oral, TID PRN    simethicone, 1 tablet, Oral, QID PRN    sodium chloride 0.9%, 10 mL, Intravenous, PRN     Review of Systems  Objective:     Weight: 95.2 kg (209 lb 14.1 oz)  Body mass index is 39.66 kg/m².  Vital Signs (Most Recent):  Temp: 98 °F (36.7 °C) (06/15/25 1543)  Pulse: 62 (06/15/25 1543)  Resp: 16 (06/15/25 0425)  BP: 107/74 (06/15/25 1543)  SpO2: 96 % (06/15/25 1543) Vital Signs (24h Range):  Temp:  [97.4 °F (36.3 °C)-98.1 °F (36.7 °C)] 98 °F (36.7 °C)  Pulse:  [56-69] 62  Resp:  [16] 16  SpO2:  [92 %-96 %] 96 %  BP: ()/(52-89) 107/74        Physical Exam   Neurosurgery Physical Exam  General: AOx3, GCS [id number]  CNII-XII: Intact on detailed exam, PERRL, visual fields grossly intact, EOMi, facial sensation preserved, no facial assymetry, tongue/uvula/palate midline, shoulder shrug equal, no pronator drift  Extremities: 5/5 motor throughout, sensorium intact throughout, coordination intact throughout, DTRs 2+, no pathological reflexes, no sensory level present     Significant Labs:  No results for input(s): "GLU", "NA", "K", "CL", "CO2", "BUN", "CREATININE", "CALCIUM", "MG" in the last 48 hours.  No results for input(s): " ""WBC", "HGB", "HCT", "PLT" in the last 48 hours.  No results for input(s): "LABPT", "INR", "APTT" in the last 48 hours.  Microbiology Results (last 7 days)       ** No results found for the last 168 hours. **          ABGs: No results for input(s): "PH", "PCO2", "PO2", "HCO3", "POCSATURATED", "BE" in the last 48 hours.  Cardiac markers: No results for input(s): "CKMB", "CPKMB", "TROPONINT", "TROPONINI", "MYOGLOBIN" in the last 48 hours.  CMP: No results for input(s): "GLU", "CALCIUM", "ALBUMIN", "PROT", "NA", "K", "CO2", "CL", "BUN", "CREATININE", "ALKPHOS", "ALT", "AST", "BILITOT" in the last 48 hours.  CRP: No results for input(s): "CRP" in the last 48 hours.  ESR: No results for input(s): "POCESR", "ERYTHROCYTES" in the last 48 hours.  LFTs: No results for input(s): "ALT", "AST", "ALKPHOS", "BILITOT", "PROT", "ALBUMIN" in the last 48 hours.  Procalcitonin: No results for input(s): "PROCAL" in the last 48 hours.    Significant Diagnostics:  I have reviewed all pertinent imaging results/findings within the past 24 hours.  Assessment/Plan:     Lumbar stenosis with neurogenic claudication  64F w/ PMH HTN, HLD who presented [place] [facility] [place] with right arm numbness and speech difficulty for rule-out stroke; found to have back pain and concern for subacute cauda equina syndrome.     --Patient admitted to medicine  --All labs and diagnostics reviewed  -MRI brain showed no concern for stroke  -MRI cervical spine showed age-related degenerative changes  -MRI lumbar spine demonstrated degenerative changes but severe stenosis at L4/5   -PVR WNLs  --Given subacute nature of neurologic symptoms but concern for subacute cauda equina syndrome, patient would likely benefit from decompressive surgery; Stroke team recommending 7 days of ASA with no recurrent stroke symptoms prior to surgery. Discussed this with patient--that since her symptoms are not persistent, PVR is WNLs and her TIA is more urgent than her lumbar decompression we will " schedule close clinic follow-up for surgery. Neurosurgery will sign-off at this time with close clinic follow-up as stated above.  --Pain medications per primary team  --Rest of care per primary team  --Please call/reconsult NSGY with any acute exam changes        Morris Schmidt MD  Neurosurgery  [facility] - Neurology

## 2025-06-15 NOTE — CONSULTS
Please see Neurology consult note completed by Dr. Martinez on 6/15/2025. This note is only to link the consult order. Thank you

## 2025-06-15 NOTE — PLAN OF CARE
Problem: Adult Inpatient Plan of Care  Goal: Plan of Care Review  Outcome: Met  Goal: Patient-Specific Goal (Individualized)  Outcome: Met  Goal: Absence of Hospital-Acquired Illness or Injury  Outcome: Met  Goal: Optimal Comfort and Wellbeing  Outcome: Met  Goal: Readiness for Transition of Care  Outcome: Met     Problem: Infection  Goal: Absence of Infection Signs and Symptoms  Outcome: Met     Problem: Stroke, Ischemic (Includes Transient Ischemic Attack)  Goal: Optimal Coping  Outcome: Progressing  Goal: Effective Bowel Elimination  Outcome: Progressing  Goal: Optimal Cerebral Tissue Perfusion  Outcome: Progressing  Goal: Optimal Cognitive Function  Outcome: Progressing  Goal: Improved Communication Skills  Outcome: Progressing  Goal: Optimal Functional Ability  Outcome: Progressing  Goal: Optimal Nutrition Intake  Outcome: Progressing  Goal: Effective Oxygenation and Ventilation  Outcome: Progressing  Goal: Improved Sensorimotor Function  Outcome: Progressing  Goal: Safe and Effective Swallow  Outcome: Met  Goal: Effective Urinary Elimination  Outcome: Met     Problem: Skin Injury Risk Increased  Goal: Skin Health and Integrity  Outcome: Met

## 2025-06-15 NOTE — PROGRESS NOTES
[facility] Medicine Progress Note        Chief Complaint: Inpatient Follow-up     HPI:   64-year-old female with a past medical history of hypertension, hyperlipidemia who presents with complaint of right arm numbness that started a proximally 2:00 p.m. on day of arrival with associated difficulty word finding.  Patient also complains of worsening urinary incontinence and affirms to feeling numb when she wipes after going to the bathroom, symptoms have been ongoing for the last 2 weeks.  She has also been struggling with increased fatigued and has low tolerance for activities.     At baseline patient lives  with her grandchildren and is independent. MRI brain with no acute intracranial abnormality and mild chronic microvascular ischemic changes. MRI C-spine showed Mild degenerative narrowing of the spinal canal at C4-C7 and severe foraminal stenosis at C3-C4 and C6-C7. MRI L spine showed Severe degenerative spinal canal stenosis at L4-5, moderate at L3-L4, mild at L1-L3. Multiple neural foraminal stenoses. Neurology and neurosurgery was consulted.     Interval Hx:   NAEO. Seen and examined. No weakness or tingling. Still has saddle anesthesia that is chronic in nature.     Case was discussed with patient's nurse and  on the floor.    Objective/physical exam:  General: In no acute distress, afebrile  Chest: Clear to auscultation bilaterally  Heart: RRR, +S1, S2, no appreciable murmur  Abdomen: Soft, nontender, BS +  MSK: Warm, no lower extremity edema, no clubbing or cyanosis  Neurologic: Alert and oriented x4, Cranial nerve II-XII intact, Strength 5/5 in all 4 extremities    VITAL SIGNS: 24 HRS MIN & MAX LAST   Temp  Min: 97.4 °F (36.3 °C)  Max: 98.2 °F (36.8 °C) 98.1 °F (36.7 °C)   BP  Min: 88/52  Max: 131/89 131/89   Pulse  Min: 56  Max: 69  62   Resp  Min: 16  Max: 16 16   SpO2  Min: 92 %  Max: 98 % (!) 94 %     I have reviewed the following labs:  Recent Labs    Lab 06/13/25  1652   WBC 6.56   RBC 4.69   HGB 13.1   HCT 40.8   MCV 87.0   MCH 27.9   MCHC 32.1*   RDW 13.2      MPV 10.5*     Recent Labs   Lab 06/13/25  1652      K 3.8      CO2 26   BUN 16.5   CREATININE 0.90      CALCIUM 8.9   MG 1.90   ALBUMIN 3.9   PROT 7.5   ALKPHOS 90   ALT 16   AST 16   BILITOT 0.5     Microbiology Results (last 7 days)       ** No results found for the last 168 hours. **             See below for Radiology    Assessment/Plan:  Right arm numbess 2/2 cervical radiculopathy vs TIA  - CT head with no acute findings, CTA with no large vessel occlusion, MRI brain with small-vessel ischemic changes  - Preliminary findings from carotid ultrasound show no significant plaques  - continue with aspirin, statin. Change to atorvastatin  - will consult neurology  - see MRI C spine below    Severe canal stenosis on L4-L5  Multi-level foraminal stenosis in C and L spine  - with the associated saddle anesthesia and incontinence for the last 2 weeks  - MRI C-spine showed Mild degenerative narrowing of the spinal canal at C4-C7 and severe foraminal stenosis at C3-C4 and C6-C7. MRI L spine showed Severe degenerative spinal canal stenosis at L4-5, moderate at L3-L4, mild at L1-L3. Multiple neural foraminal stenoses. Neurology and neurosurgery was consulted.   - consult neurosurgery for concerns of cord compression  - pending MRI T spine as per neurosurgery  - spoke with neurology (Dr. Martinez) and neurosurgery (Dr. Schmidt); pending neurology recs if aspirin can be held. If aspirin cannot be held than the surgery will happen outpatient as per neurosurgery given it is not emergent given chronicity of symptoms. If aspirin can be held then neurosurgery thinking of intervention in the hospital  - PT/OT     Sinus Bradycardia  -patient has complains of poor tolerance performing activities of daily living   -follow echocardiogram   -perhaps bradycardia is stability of her symptoms however  review of her heart rate on her Apple watch shows that she ranges from 50-65 at rest, no lower   -follows with Dr. Laura   - will monitor on tele for now    Morbid obesity  - advised on diet and lifestyle  - obtain labs in AM    Hx of HTN and HLD       Prolonged care - 105 mins    VTE prophylaxis: lovenox    Patient condition:  Fair    Anticipated discharge and Disposition:         All diagnosis and differential diagnosis have been reviewed; assessment and plan has been documented; I have personally reviewed the labs and test results that are presently available; I have reviewed the patients medication list; I have reviewed the consulting providers response and recommendations. I have reviewed or attempted to review medical records based upon their availability    All of the patient's questions have been  addressed and answered. Patient's is agreeable to the above stated plan. I will continue to monitor closely and make adjustments to medical management as needed.    _____________________________________________________________________    Malnutrition Status:  Nutrition consulted. Most recent weight and BMI monitored-     Measurements:  Wt Readings from Last 1 Encounters:   06/14/25 95.2 kg (209 lb 14.1 oz)   Body mass index is 39.66 kg/m².    Patient has been screened and assessed by RD.    Malnutrition Type:  Context:    Level:      Malnutrition Characteristic Summary:       Interventions/Recommendations (treatment strategy):        Scheduled Med:   aspirin  81 mg Oral Daily    atorvastatin  40 mg Oral QHS    enoxparin  40 mg Subcutaneous Daily    EScitalopram oxalate  20 mg Oral Daily    gabapentin  300 mg Oral QHS    lisinopriL  5 mg Oral Daily      Continuous Infusions:     PRN Meds:    Current Facility-Administered Medications:     acetaminophen, 650 mg, Oral, Q4H PRN    albuterol-ipratropium, 3 mL, Nebulization, Q6H PRN    aluminum-magnesium hydroxide-simethicone, 30 mL, Oral, QID PRN    bisacodyL, 10 mg,  Rectal, Daily PRN    dextrose 50%, 12.5 g, Intravenous, PRN    dextrose 50%, 25 g, Intravenous, PRN    glucagon (human recombinant), 1 mg, Intramuscular, PRN    glucose, 16 g, Oral, PRN    glucose, 24 g, Oral, PRN    hydrALAZINE, 10 mg, Intravenous, Q6H PRN    HYDROcodone-acetaminophen, 1 tablet, Oral, Q6H PRN    melatonin, 9 mg, Oral, Nightly PRN    morphine, 2 mg, Intravenous, Q6H PRN    naloxone, 0.02 mg, Intravenous, PRN    ondansetron, 8 mg, Oral, Q8H PRN    ondansetron, 4 mg, Intravenous, Q8H PRN    polyethylene glycol, 17 g, Oral, TID PRN    simethicone, 1 tablet, Oral, QID PRN    sodium chloride 0.9%, 10 mL, Intravenous, PRN     Radiology:  I have personally reviewed the following imaging and agree with the radiologist.     X-Ray Lumbar Complete Including Flex And Ext  Narrative: EXAMINATION:  XR LUMBAR SPINE 5 VIEW WITH FLEX AND EXT    CLINICAL HISTORY:  R/O dynamic instability;    TECHNIQUE:  Five 8    COMPARISON:  The November 1, 2025.    FINDINGS:  There is mild dextroscoliotic curvature which is centered at thoracolumbar junction.  There is grade 2 anterolisthesis of L4 on L5 by 14 mm without significant change with both flexion and extension.  Lumbar vertebrae stature is preserved throughout.  There are multilevel intervertebral disc space degenerative changes and prominent facets arthropathy.  These findings are most pronounced at the level of L4-L5 and L5-S1.  Impression: Multilevel degenerative disc disease and spondylosis.    Electronically signed by: Pablo Nuñez  Date:    06/14/2025  Time:    17:10  Echo    Left Ventricle: The left ventricle is normal in size. Normal wall   thickness. There is normal systolic function with a visually estimated   ejection fraction of 55 - 60%. There is normal diastolic function.    Right Ventricle: The right ventricle is normal in size Systolic   function is normal.    Left Atrium: Agitated saline study of the atrial septum is negative,   suggesting absence of  intracardiac shunt at the atrial level.    Mitral Valve: Mildly calcified leaflets. There is trace regurgitation.    Tricuspid Valve: There is mild regurgitation.    IVC/SVC: Normal venous pressure at 3 mmHg.    Pericardium: There is no pericardial effusion.  MRI Lumbar Spine W WO Cont  Narrative: EXAMINATION:  MRI LUMBAR SPINE W WO CONTRAST    CLINICAL HISTORY:  concern for cord compression;    TECHNIQUE:  Multiplanar multisequence MR images of the lumbar spine are obtained with and without contrast.    COMPARISON:  CT lumbar spine dated 06/14/2025    FINDINGS:  There are 5 non-rib-bearing lumbar type vertebral bodies.  There is grade 1 anterolisthesis of L4 over L5.  The vertebral body heights are maintained.  There is no bone marrow edema or enhancement.    The conus terminates at the level of L1.  It is normal in signal and contour.  There is no abnormal intrathecal or epidural enhancement.  There is no epidural fluid collection.    Disc spaces, spinal canal and neural foramina are as follows:    L1-L2: Disc bulge and facet hypertrophy with mild narrowing of the spinal canal.  Mild bilateral neural foraminal stenosis.    L2-L3: Disc bulge and facet hypertrophy with mild narrowing of the spinal canal.  Mild bilateral neural foraminal stenosis.    L3-L4: Disc bulge and facet hypertrophy with moderate narrowing of the spinal canal.  Mild bilateral neural foraminal stenosis.    L4-L5: Grade 1 anterolisthesis of L4 over L5 with disc bulge, facet hypertrophy and severe narrowing of the spinal canal.  Moderate bilateral neural foraminal stenosis.    L5-S1: No disc herniation.  Bilateral facet hypertrophy.  No significant spinal canal stenosis.  Severe right and mild left neural foraminal stenosis.    No significant abnormality within the visualized paraspinous musculature.  Impression: 1. Severe degenerative spinal canal stenosis at L4-5, moderate at L3-L4, mild at L1-L3.  2. Multiple neural foraminal stenoses as  described.  3. No abnormal enhancement.    Electronically signed by: Keri Shetty  Date:    06/14/2025  Time:    11:45  MRI Cervical Spine W WO Cont  Narrative: EXAMINATION:  MRI CERVICAL SPINE W WO CONTRAST    CLINICAL HISTORY:  Neck pain, chronic, degenerative changes on xray;    TECHNIQUE:  Multiplanar, multisequence MR imaging of the cervical spine with and without contrast.    COMPARISON:  None    FINDINGS:  There is reversal of normal cervical lordosis.  The vertebral body heights are maintained.  There are degenerative endplate changes at C5-C6 with reactive edema and enhancement.    There is no cord signal abnormality.  There is no abnormal intrathecal or epidural enhancement.  There is no epidural fluid collection.    The paraspinal soft tissues are unremarkable.    Disc level analysis as follows:    C2-C3: No disc herniation.  No significant spinal canal stenosis.  Mild right and moderate left neural foraminal stenosis secondary to uncovertebral and facet hypertrophy.    C3-C4: Posterior disc osteophyte complex.  No significant spinal canal stenosis.  Moderate right and severe left neural foraminal stenosis secondary to uncovertebral and facet hypertrophy.    C4-C5: Posterior disc osteophyte complex and thickening of the ligamentum flavum mild narrowing the spinal canal.  Moderate bilateral neural foraminal stenosis secondary to uncovertebral and facet hypertrophy.    C5-C6: Posterior disc osteophyte complex with mild narrowing of the spinal canal.  Severe bilateral foraminal stenosis secondary to uncovertebral and facet hypertrophy.    C6-C7: Posterior disc osteophyte complex with mild narrowing of the spinal canal.  Moderate right and severe left neural foraminal stenosis secondary to uncovertebral and facet hypertrophy.    C7-T1: No disc herniation.  No significant spinal canal stenosis.  Mild left neural foraminal stenosis secondary to facet hypertrophy.  Impression: 1. Mild degenerative narrowing  of the spinal canal at C4-C7.  2. Multilevel neural foraminal stenoses as described.  3. No cord signal abnormality or abnormal enhancement.    Electronically signed by: Keri Shetty  Date:    06/14/2025  Time:    11:40  CT Lumbar Spine With Contrast  Narrative: EXAMINATION:  CT LUMBAR SPINE WITH CONTRAST    CLINICAL HISTORY:  Spinal stenosis, lumbar;    TECHNIQUE:  Postcontrast CT images of the lumbar spine. Axial, coronal, and sagittal reformatted images were obtained. Dose length product is 1193 mGycm. Automatic exposure control, adjustment of mA/kV or iterative reconstruction technique was used to limit radiation dose.    COMPARISON:  X-rays dated 11/01/2023    FINDINGS:  There are 5 non-rib-bearing lumbar type vertebral bodies.  There is grade 1 anterolisthesis of L4 over L5 and L5-S1.  The vertebral body heights are maintained.  There is no acute fracture identified.  There is mild disc height loss at L1-L3 and L5-S1 with moderate multilevel marginal osteophytes.  There is moderate to severe facet arthropathy in the lower lumbar spine.  There is likely severe canal stenosis at L4-5 with disc bulge and facet hypertrophy.  There is likely moderate canal narrowing at L1 through L4 with disc bulge and facet hypertrophy.  There is moderate neural foraminal narrowing bilaterally at L4-5, severe on the right at L5-S1.  The paraspinal soft tissues are unremarkable.  Impression: 1. No acute fracture identified.  2. Multilevel degenerative changes of the lumbar spine with likely severe canal stenosis at L4-5.    Electronically signed by: Keri Shetty  Date:    06/14/2025  Time:    09:31  MRI Brain Without Contrast  Narrative: EXAMINATION:  MRI BRAIN WITHOUT CONTRAST    CLINICAL HISTORY:  Transient ischemic attack (TIA);Stroke, follow up;    TECHNIQUE:  Multiplanar, multisequence MR images of the brain were obtained without the administration of intravenous contrast.    COMPARISON:  CT head dated  06/13/2025    FINDINGS:  There is no restricted diffusion, hemorrhage or edema.  Mild scattered T2/FLAIR hyperintensities in the subcortical and periventricular white matter likely represent chronic microvascular ischemic changes.    There is no mass effect or midline shift.  The basal cisterns are patent.  The ventricles are normal in size.  There is no abnormal extra-axial fluid collection.  The major intracranial flow voids are patent.  The paranasal sinuses are clear.  Impression: 1. No acute intracranial abnormality.  2. Mild chronic microvascular ischemic changes  No significant change from the [employer] interpretation.    Electronically signed by: Keri Shetty  Date:    06/14/2025  Time:    08:56  CTA Head and Neck (xpd)  Narrative: EXAMINATION:  CTA HEAD AND NECK (XPD)    CLINICAL HISTORY:  Stroke/TIA, determine embolic source;    TECHNIQUE:  Axial images of the head and neck were obtained with IV contrast administration.  Coronal and sagittal reconstructions were provided.  Three dimensional and MIP images were obtained and evaluated.  Total DLP was 1634 mGy-cm. Dose lowering technique and automated exposure control were utilized for this exam.The degree of carotid stenosis was evaluated using NASCET criteria.    COMPARISON:  MRI of the brain 06/13/2025.    FINDINGS:  Vascular findings:    There is a normal branching pattern of the aortic arch.  The brachiocephalic artery is normal.  The right common carotid artery is normal.  The right cervical ICA is normal.  The left common carotid artery is normal.  There is mixed density plaque at the left carotid bifurcation and proximal left ICA without flow limiting stenosis.  The remainder of the left cervical ICA is normal.  The dominant right and non dominant left vertebral arteries are normal throughout their cervical courses.  The intradural vertebral arteries are normal.  The basilar artery is normal.  There is a fetal origin of the bilateral PCA.  There  are scattered calcifications in the cavernous carotids without flow limiting stenosis.  The [id number] is intact.  There is no aneurysm.  There is no focal stenosis.  There is no vessel cut off.    Nonvascular findings:    There is no hemorrhage, hydrocephalus, or midline shift.  There is no abnormal intracranial enhancement.  The bilateral orbits are normal.  The paranasal sinuses are free of disease.  There nasopharynx and oropharynx are widely patent.  The parotid glands are normal.  The thyroid gland is normal.  The lung apices are clear.  Impression: 1. Mixed density plaque in the left carotid bifurcation and proximal left ICA without flow limiting stenosis.  2. Calcification in the bilateral cavernous carotids without flow limiting stenosis.  [facility] concordance    Electronically signed by: Phani Shields MD  Date:    06/14/2025  Time:    08:42      Akosua Lui MD  Department of Hospital Medicine   [facility]   06/15/2025

## 2025-06-15 NOTE — PROGRESS NOTES
Pt seen/examined  Mental Status: Alert and oriented x3. Language is fluent with good comprehension.    Cranial Nerve: Pupils are equal, round, and reactive to light. Visual fields are intact to confrontation. Normal fundi. Ocular movements are intact. Face is symmetric at rest and with activation with intact sensation throughout. Hearing intact to finger rub bilaterally. Muscles of tongue and palate activate symmetrically. No dysarthria. Strength is full in sternocleidomastoid and trapezius bilaterally.    Motor: Muscle bulk and tone are normal. Strength is 5/5 in all four extremities both proximally and distally. Intact fine motor movements bilaterally. There is no pronator drift or satelliting on arm roll.    Sensory: Sensation is intact to light touch, pinprick, vibration, and proprioception throughout. Romberg is negative.    Reflexes: 2+ and symmetric at the biceps, triceps, brachioradialis, patella, and Achilles bilaterally. Plantar response is flexor bilaterally.    Coordination: No dysmetria on finger-nose-finger or heel-knee-shin. Normal rapid alternating movements. Fast finger tapping with normal amplitude and speed.    Gait: Narrow based with normal stride length and good arm swing bilaterally. Able to walk on heels, toes, and in tandem.          S  Pt states LE numbness is going on for 2 years; slowly progressive  No bowel/bladder issues    Recs  I would recommend having her get at least 7 days of ASA in her before operating as there have been no acute changes in her symptoms (at least from what she tells me) before doing spinal surgery  OK to hold Asa preoperatively per surgeon protocol when the time comes

## 2025-06-15 NOTE — SUBJECTIVE & OBJECTIVE
Interval History: NAEON, AFVSS, Exam stable. No events of incontinence or saddle anesthesia. Stroke team recommending 7 days of ASA with no recurrent stroke symptoms prior to surgery. Discussed this with patient--that since her symptoms are not persistent, PVR is WNLs and her TIA is more urgent than her lumbar decompression we will schedule close clinic follow-up for surgery.    Medications:  Continuous Infusions:  Scheduled Meds:   aspirin  81 mg Oral Daily    atorvastatin  40 mg Oral QHS    enoxparin  40 mg Subcutaneous Daily    EScitalopram oxalate  20 mg Oral Daily    gabapentin  300 mg Oral QHS    lisinopriL  5 mg Oral Daily     PRN Meds:  Current Facility-Administered Medications:     acetaminophen, 650 mg, Oral, Q4H PRN    albuterol-ipratropium, 3 mL, Nebulization, Q6H PRN    aluminum-magnesium hydroxide-simethicone, 30 mL, Oral, QID PRN    bisacodyL, 10 mg, Rectal, Daily PRN    dextrose 50%, 12.5 g, Intravenous, PRN    dextrose 50%, 25 g, Intravenous, PRN    glucagon (human recombinant), 1 mg, Intramuscular, PRN    glucose, 16 g, Oral, PRN    glucose, 24 g, Oral, PRN    hydrALAZINE, 10 mg, Intravenous, Q6H PRN    HYDROcodone-acetaminophen, 1 tablet, Oral, Q6H PRN    melatonin, 9 mg, Oral, Nightly PRN    morphine, 2 mg, Intravenous, Q6H PRN    naloxone, 0.02 mg, Intravenous, PRN    ondansetron, 8 mg, Oral, Q8H PRN    ondansetron, 4 mg, Intravenous, Q8H PRN    polyethylene glycol, 17 g, Oral, TID PRN    simethicone, 1 tablet, Oral, QID PRN    sodium chloride 0.9%, 10 mL, Intravenous, PRN     Review of Systems  Objective:     Weight: 95.2 kg (209 lb 14.1 oz)  Body mass index is 39.66 kg/m².  Vital Signs (Most Recent):  Temp: 98 °F (36.7 °C) (06/15/25 1543)  Pulse: 62 (06/15/25 1543)  Resp: 16 (06/15/25 0425)  BP: 107/74 (06/15/25 1543)  SpO2: 96 % (06/15/25 1543) Vital Signs (24h Range):  Temp:  [97.4 °F (36.3 °C)-98.1 °F (36.7 °C)] 98 °F (36.7 °C)  Pulse:  [56-69] 62  Resp:  [16] 16  SpO2:  [92 %-96 %] 96  "%  BP: ()/(52-89) 107/74        Physical Exam   Neurosurgery Physical Exam  General: AOx3, GCS [id number]  CNII-XII: Intact on detailed exam, PERRL, visual fields grossly intact, EOMi, facial sensation preserved, no facial assymetry, tongue/uvula/palate midline, shoulder shrug equal, no pronator drift  Extremities: 5/5 motor throughout, sensorium intact throughout, coordination intact throughout, DTRs 2+, no pathological reflexes, no sensory level present     Significant Labs:  No results for input(s): "GLU", "NA", "K", "CL", "CO2", "BUN", "CREATININE", "CALCIUM", "MG" in the last 48 hours.  No results for input(s): "WBC", "HGB", "HCT", "PLT" in the last 48 hours.  No results for input(s): "LABPT", "INR", "APTT" in the last 48 hours.  Microbiology Results (last 7 days)       ** No results found for the last 168 hours. **          ABGs: No results for input(s): "PH", "PCO2", "PO2", "HCO3", "POCSATURATED", "BE" in the last 48 hours.  Cardiac markers: No results for input(s): "CKMB", "CPKMB", "TROPONINT", "TROPONINI", "MYOGLOBIN" in the last 48 hours.  CMP: No results for input(s): "GLU", "CALCIUM", "ALBUMIN", "PROT", "NA", "K", "CO2", "CL", "BUN", "CREATININE", "ALKPHOS", "ALT", "AST", "BILITOT" in the last 48 hours.  CRP: No results for input(s): "CRP" in the last 48 hours.  ESR: No results for input(s): "POCESR", "ERYTHROCYTES" in the last 48 hours.  LFTs: No results for input(s): "ALT", "AST", "ALKPHOS", "BILITOT", "PROT", "ALBUMIN" in the last 48 hours.  Procalcitonin: No results for input(s): "PROCAL" in the last 48 hours.    Significant Diagnostics:  I have reviewed all pertinent imaging results/findings within the past 24 hours.  "

## 2025-06-16 VITALS
DIASTOLIC BLOOD PRESSURE: 69 MMHG | RESPIRATION RATE: 16 BRPM | HEIGHT: 61 IN | BODY MASS INDEX: 39.63 KG/M2 | TEMPERATURE: 98 F | OXYGEN SATURATION: 98 % | WEIGHT: 209.88 LBS | SYSTOLIC BLOOD PRESSURE: 119 MMHG | HEART RATE: 59 BPM

## 2025-06-16 LAB
LEFT CCA DIST DIAS: 22 CM/S
LEFT CCA DIST SYS: 94 CM/S
LEFT CCA PROX DIAS: 19 CM/S
LEFT CCA PROX SYS: 100 CM/S
LEFT ECA DIAS: 5 CM/S
LEFT ECA SYS: 76 CM/S
LEFT ICA DIST DIAS: 28 CM/S
LEFT ICA DIST SYS: 112 CM/S
LEFT ICA MID DIAS: 23 CM/S
LEFT ICA MID SYS: 91 CM/S
LEFT ICA PROX DIAS: 4 CM/S
LEFT ICA PROX SYS: 98 CM/S
LEFT VERTEBRAL DIAS: 5 CM/S
LEFT VERTEBRAL SYS: 59 CM/S
OHS CV CAROTID RIGHT ICA EDV HIGHEST: 24
OHS CV CAROTID ULTRASOUND LEFT ICA/CCA RATIO: 1.19
OHS CV CAROTID ULTRASOUND RIGHT ICA/CCA RATIO: 1.1
OHS CV PV CAROTID LEFT HIGHEST CCA: 100
OHS CV PV CAROTID LEFT HIGHEST ICA: 112
OHS CV PV CAROTID RIGHT HIGHEST CCA: 119
OHS CV PV CAROTID RIGHT HIGHEST ICA: 116
OHS CV US CAROTID LEFT HIGHEST EDV: 28
RIGHT CCA DIST DIAS: 14 CM/S
RIGHT CCA DIST SYS: 105 CM/S
RIGHT CCA PROX DIAS: 5 CM/S
RIGHT CCA PROX SYS: 119 CM/S
RIGHT ECA DIAS: 8 CM/S
RIGHT ECA SYS: 70 CM/S
RIGHT ICA DIST DIAS: 18 CM/S
RIGHT ICA DIST SYS: 104 CM/S
RIGHT ICA MID DIAS: 24 CM/S
RIGHT ICA MID SYS: 116 CM/S
RIGHT ICA PROX DIAS: 14 CM/S
RIGHT ICA PROX SYS: 94 CM/S
RIGHT VERTEBRAL DIAS: 6 CM/S
RIGHT VERTEBRAL SYS: 39 CM/S

## 2025-06-16 PROCEDURE — 25000003 PHARM REV CODE 250: Performed by: STUDENT IN AN ORGANIZED HEALTH CARE EDUCATION/TRAINING PROGRAM

## 2025-06-16 RX ORDER — ATORVASTATIN CALCIUM 40 MG/1
40 TABLET, FILM COATED ORAL NIGHTLY
Qty: 30 TABLET | Refills: 0 | Status: SHIPPED | OUTPATIENT
Start: 2025-06-16 | End: 2025-07-16

## 2025-06-16 RX ADMIN — ESCITALOPRAM OXALATE 20 MG: 10 TABLET ORAL at 10:06

## 2025-06-16 RX ADMIN — ASPIRIN 81 MG: 81 TABLET, COATED ORAL at 10:06

## 2025-06-16 RX ADMIN — LISINOPRIL 5 MG: 2.5 TABLET ORAL at 10:06

## 2025-06-16 NOTE — DISCHARGE SUMMARY
[facility] Medicine Discharge Summary    Admit Date: 6/13/2025  Discharge Date and Time: 6/16/20257:31 AM  Admitting Physician:  Team  Discharging Physician: Akosua Lui MD.  Primary Care Physician: Cassia Lugo NP  Consults: Neurology and Neurosurgery    Discharge Diagnoses:  # Right arm numbess 2/2 cervical radiculopathy vs TIA   # Severe canal stenosis on L4-L5  # Multi-level foraminal stenosis in C and L spine  # Asymptomatic Sinus bradycardia - follows with Dr. Bradley Laura  # Morbid obesity  # Hx of HTN and HLD     Hospital Course:   64-year-old female with a past medical history of hypertension, hyperlipidemia who presents with complaint of right arm numbness that started a proximally 2:00 p.m. on day of arrival with associated difficulty word finding.  Patient also complains of worsening urinary incontinence and affirms to feeling numb when she wipes after going to the bathroom, symptoms have been ongoing for the last 2 weeks.  She has also been struggling with increased fatigued and has low tolerance for activities.     At baseline patient lives  with her grandchildren and is independent. MRI brain with no acute intracranial abnormality and mild chronic microvascular ischemic changes. MRI C-spine showed Mild degenerative narrowing of the spinal canal at C4-C7 and severe foraminal stenosis at C3-C4 and C6-C7. MRI L spine showed Severe degenerative spinal canal stenosis at L4-5, moderate at L3-L4, mild at L1-L3. Multiple neural foraminal stenoses. Neurology and neurosurgery was consulted. MRI T spine showed Mild degenerative narrowing of the spinal canal at T8-T12. Moderate neural foraminal narrowing bilaterally at T9-T11.    Spoke with neurology (Dr. Martinez) and neurosurgery (Dr. Schmidt); pending neurology recs if aspirin can be held. If aspirin cannot be held than the surgery will happen outpatient as per neurosurgery given it is not emergent given  chronicity of symptoms. If aspirin can be held then neurosurgery thinking of intervention in the hospital.     After that Dr. Martinez recommended not holding the aspirin for atleast a week. Neurosurgery planning for intervention outpatient with close follow up. Cleared by neurology (Dr. Martinez) and neurosurgery (Dr. Schmidt) for discharge.    Pt was seen and examined on the day of discharge. Stable to be discharged with PCP, neurology and neurosurgery follow up.    Vitals:  VITAL SIGNS: 24 HRS MIN & MAX LAST   Temp  Min: 97.5 °F (36.4 °C)  Max: 98.1 °F (36.7 °C) 97.5 °F (36.4 °C)   BP  Min: 103/67  Max: 131/89 111/65   Pulse  Min: 51  Max: 68  (!) 51   Resp  Min: 16  Max: 16 16   SpO2  Min: 90 %  Max: 97 % 97 %       Physical Exam:  General: In no acute distress, afebrile  Chest: Clear to auscultation bilaterally  Heart: RRR, +S1, S2, no appreciable murmur  Abdomen: Soft, nontender, BS +  MSK: Warm, no lower extremity edema, no clubbing or cyanosis  Neurologic: Alert and oriented x4, Cranial nerve II-XII intact, Strength 5/5 in all 4 extremities    Procedures Performed: No admission procedures for hospital encounter.     Significant Diagnostic Studies: See Full reports for all details    Recent Labs   Lab 06/13/25  1652   WBC 6.56   RBC 4.69   HGB 13.1   HCT 40.8   MCV 87.0   MCH 27.9   MCHC 32.1*   RDW 13.2      MPV 10.5*       Recent Labs   Lab 06/13/25  1652      K 3.8      CO2 26   BUN 16.5   CREATININE 0.90      CALCIUM 8.9   MG 1.90   ALBUMIN 3.9   PROT 7.5   ALKPHOS 90   ALT 16   AST 16   BILITOT 0.5        Microbiology Results (last 7 days)       ** No results found for the last 168 hours. **             MRI Thoracic Spine Without Contrast  Narrative: EXAMINATION:  MRI THORACIC SPINE WITHOUT CONTRAST    CLINICAL HISTORY:  R/O NEERU;    TECHNIQUE:  Multiplanar multisequence MR images of the thoracic spine are obtained without contrast.    COMPARISON:  None    FINDINGS:  Thoracic  alignment is normal.  The vertebral body heights are maintained.  There are multilevel degenerative endplate changes, with reactive endplate edema at T9-T10.    There is no cord signal abnormality.  There is no epidural fluid collection.    There is a small left central disc protrusion at T3-T4 measuring 2 mm in AP dimension.  There is mild degenerative canal narrowing at T8-T9, T9-T10, T10-T11 and T11-T12 secondary to disc bulges and facet hypertrophy.  There is moderate neural foraminal narrowing bilaterally at T9-T11.    The paraspinal soft tissues unremarkable.  Impression: 1. Mild degenerative narrowing of the spinal canal at T8-T12.  2. Moderate neural foraminal narrowing bilaterally at T9-T11.  3. No cord signal abnormality.    Electronically signed by: Keri Shetty  Date:    06/15/2025  Time:    13:40         Medication List        START taking these medications      atorvastatin 40 MG tablet  Commonly known as: LIPITOR  Take 1 tablet (40 mg total) by mouth every evening.            CONTINUE taking these medications      aspirin 81 MG EC tablet  Commonly known as: ECOTRIN     azelastine 137 mcg (0.1 %) nasal spray  Commonly known as: ASTELIN  1 spray (137 mcg total) by Nasal route 2 (two) times daily.     clotrimazole-betamethasone 1-0.05% cream  Commonly known as: LOTRISONE  Apply topically 2 (two) times daily.     EScitalopram oxalate 20 MG tablet  Commonly known as: LEXAPRO  TAKE ONE TABLET BY MOUTH ONCE DAILY     gabapentin 300 MG capsule  Commonly known as: NEURONTIN  TAKE ONE CAPSULE BY MOUTH EACH EVENING     lisinopriL 5 MG tablet  Commonly known as: PRINIVIL,ZESTRIL  TAKE ONE TABLET BY MOUTH ONCE DAILY     omeprazole 20 MG capsule  Commonly known as: PRILOSEC            STOP taking these medications      estradioL 0.01 % (0.1 mg/gram) vaginal cream  Commonly known as: ESTRACE     meclizine 25 mg tablet  Commonly known as: ANTIVERT     pravastatin 40 MG tablet  Commonly known as: PRAVACHOL                Where to Get Your Medications        These medications were sent to [facility] [address]  [address] [address]      Phone: [phone]   atorvastatin 40 MG tablet          Explained in detail to the patient about the discharge plan, medications, and follow-up visits. Pt understands and agrees with the treatment plan  Discharge Disposition:   home  Discharged Condition: stable  Diet-   Dietary Orders (From admission, onward)       Start     Ordered    06/14/25 1521  Diet Heart Healthy  Diet effective now         06/14/25 1520                   Medications Per DC med rec  Activities as tolerated   Follow-up Information       [facility] - Neurosurgery Follow up in 1 week(s).    Specialty: Neurosurgery  Why: For discussion and surgical planning  Contact information:  [address] [facility] [address]             Cassia Lugo NP. Call in 1 week(s).    Specialty: Family Medicine  Contact information:  [address] [place] [address]  [phone]               Cassia Lugo NP Follow up.    Specialty: Family Medicine  Contact information:  [address] [place] [address]  [phone]               Johny Martinez MD. Call in 1 week(s).    Specialties: Neurology, Sleep Medicine  Contact information:  [address] [place] [address] [address]  [place] [address]  [phone]               Morris Schmidt MD. Call in 1 week(s).    Specialties: Neurosurgery, Radiology  Contact information:  [address] [place] [address] [address]  [place] [address]  [phone]                           For further questions contact hospitalist office    Discharge time 75 minutes    For worsening symptoms, chest pain, shortness of breath, increased abdominal pain, high grade fever, stroke or stroke like symptoms, immediately go to the nearest Emergency Room or call 911 as soon as possible.      Akosua Dailey M.D, on 6/16/2025. at 7:31 AM.

## 2025-06-16 NOTE — PLAN OF CARE
06/16/25 1329   Final Note   Assessment Type Final Discharge Note   Anticipated Discharge Disposition Home-Health   Post-Acute Status   Post-Acute Authorization Home [facility]   Home Health Status Set-up Complete/Auth obtained   Discharge Delays None known at this time     Patient is discharged to home health.  Verified with Leann at [facility] that patient is accepted. No further discharge planning needs identified at this time.

## 2025-06-16 NOTE — PLAN OF CARE
06/16/25 1226   Discharge Assessment   Assessment Type Discharge Planning Assessment   Confirmed/corrected address, phone number and insurance Yes   Confirmed Demographics Correct on Facesheet   Source of Information patient   Communicated ANTOINETTE with patient/caregiver Yes   People in Home grandchild(starla)   Name(s) of People in Home Two dependent grandchildren   Do you expect to return to your current living situation? Yes   Do you have help at home or someone to help you manage your care at home? Yes   Who are your caregiver(s) and their phone number(s)? Daughter in Law Clair Bone - [phone]   Prior to hospitilization cognitive status: Alert/Oriented   Current cognitive status: Alert/Oriented   Equipment Currently Used at Home none   Readmission within 30 days? No   Patient currently being followed by outpatient case management? No   Do you currently have service(s) that help you manage your care at home? No   Do you take prescription medications? Yes   Do you have prescription coverage? Yes   Do you have any problems affording any of your prescribed medications? No   Is the patient taking medications as prescribed? yes   Who is going to help you get home at discharge? Daughter in law Clair Bone [phone]   How do you get to doctors appointments? car, drives self   Are you on dialysis? No   Do you take coumadin? No   Discharge Plan A Home with family   Discharge Plan B Home Health   DME Needed Upon Discharge  none   Discharge Plan discussed with: Patient   Transition of Care Barriers None     Patient admitted with left arm numbness. PCP is Cassia Lugo NP.  Pharmacy is "[facility] in [place]. Does not use home health.  Discussed recommendations for low intensity therapy after discharge.  She prefers home health over outpatient.  Denies unmet discharge planning needs.  Given list of home health providers. FOC obtained.  Referral sent to [facility].

## 2025-06-17 ENCOUNTER — PATIENT OUTREACH (OUTPATIENT)
Dept: ADMINISTRATIVE | Facility: CLINIC | Age: 65
End: 2025-06-17
Payer: MEDICARE

## 2025-06-17 NOTE — PROGRESS NOTES
C3 nurse spoke with Jasmyne Bone for a TCC post hospital discharge follow up call.  The patient has a scheduled HOSFU appointment with Cassia Lugo NP on 6/23/25 @ 10:00.

## 2025-06-17 NOTE — PROGRESS NOTES
C3 nurse attempted to contact Jasmyne Bone for a TCC post hospital discharge follow up call. No answer. LVM. The patient has a scheduled HOSFU appointment with Cassia Lugo NP on 6/23/25 @ 10:00.

## 2025-06-18 ENCOUNTER — TELEPHONE (OUTPATIENT)
Dept: ADMINISTRATIVE | Facility: CLINIC | Age: 65
End: 2025-06-18
Payer: MEDICARE

## 2025-06-18 NOTE — TELEPHONE ENCOUNTER
"Phoned patient in response to reply of "2" to post-discharge texting tracker. Ms. Bone states she replied in error. She does not have any issues or concerns to be addressed. She is feeling well and has a f/u with her PCP tomorrow and with Neurosurgery on the 23rd.        "

## 2025-06-19 ENCOUNTER — OFFICE VISIT (OUTPATIENT)
Dept: FAMILY MEDICINE | Facility: CLINIC | Age: 65
End: 2025-06-19
Payer: MEDICARE

## 2025-06-19 VITALS
BODY MASS INDEX: 37.95 KG/M2 | WEIGHT: 201 LBS | HEIGHT: 61 IN | DIASTOLIC BLOOD PRESSURE: 78 MMHG | RESPIRATION RATE: 20 BRPM | TEMPERATURE: 97 F | HEART RATE: 74 BPM | SYSTOLIC BLOOD PRESSURE: 126 MMHG

## 2025-06-19 DIAGNOSIS — I10 HYPERTENSION, UNSPECIFIED TYPE: ICD-10-CM

## 2025-06-19 DIAGNOSIS — M54.42 CHRONIC BILATERAL LOW BACK PAIN WITH BILATERAL SCIATICA: ICD-10-CM

## 2025-06-19 DIAGNOSIS — G89.29 CHRONIC BILATERAL LOW BACK PAIN WITH BILATERAL SCIATICA: ICD-10-CM

## 2025-06-19 DIAGNOSIS — F32.89 OTHER DEPRESSION: ICD-10-CM

## 2025-06-19 DIAGNOSIS — F41.9 ANXIETY: ICD-10-CM

## 2025-06-19 DIAGNOSIS — E66.812 CLASS 2 SEVERE OBESITY DUE TO EXCESS CALORIES WITH SERIOUS COMORBIDITY IN ADULT, UNSPECIFIED BMI: ICD-10-CM

## 2025-06-19 DIAGNOSIS — Z86.73 HISTORY OF TIA (TRANSIENT ISCHEMIC ATTACK): ICD-10-CM

## 2025-06-19 DIAGNOSIS — M48.062 LUMBAR STENOSIS WITH NEUROGENIC CLAUDICATION: ICD-10-CM

## 2025-06-19 DIAGNOSIS — M54.41 CHRONIC BILATERAL LOW BACK PAIN WITH BILATERAL SCIATICA: ICD-10-CM

## 2025-06-19 DIAGNOSIS — E66.01 CLASS 2 SEVERE OBESITY DUE TO EXCESS CALORIES WITH SERIOUS COMORBIDITY IN ADULT, UNSPECIFIED BMI: ICD-10-CM

## 2025-06-19 PROBLEM — R42 VERTIGO: Status: RESOLVED | Noted: 2023-10-13 | Resolved: 2025-06-19

## 2025-06-19 PROBLEM — R20.0 LEFT ARM NUMBNESS: Status: RESOLVED | Noted: 2025-06-13 | Resolved: 2025-06-19

## 2025-06-19 PROBLEM — E66.9 OBESITY: Status: RESOLVED | Noted: 2025-06-13 | Resolved: 2025-06-19

## 2025-06-19 PROBLEM — R82.90 ABNORMAL URINALYSIS: Status: RESOLVED | Noted: 2023-04-13 | Resolved: 2025-06-19

## 2025-06-19 PROBLEM — R50.9 FEVER: Status: RESOLVED | Noted: 2024-03-25 | Resolved: 2025-06-19

## 2025-06-19 PROBLEM — R06.2 WHEEZING: Status: RESOLVED | Noted: 2024-03-25 | Resolved: 2025-06-19

## 2025-06-19 PROBLEM — M25.511 CHRONIC RIGHT SHOULDER PAIN: Status: RESOLVED | Noted: 2024-06-25 | Resolved: 2025-06-19

## 2025-06-19 PROBLEM — H81.13 BENIGN PAROXYSMAL POSITIONAL VERTIGO DUE TO BILATERAL VESTIBULAR DISORDER: Status: RESOLVED | Noted: 2023-10-13 | Resolved: 2025-06-19

## 2025-06-19 PROBLEM — R05.9 COUGH: Status: RESOLVED | Noted: 2024-03-25 | Resolved: 2025-06-19

## 2025-06-19 PROBLEM — N95.2 ATROPHIC VAGINITIS: Status: RESOLVED | Noted: 2023-10-13 | Resolved: 2025-06-19

## 2025-06-19 PROBLEM — R00.1 BRADYCARDIA: Status: RESOLVED | Noted: 2023-05-30 | Resolved: 2025-06-19

## 2025-06-19 PROBLEM — R30.0 DYSURIA: Status: RESOLVED | Noted: 2023-10-13 | Resolved: 2025-06-19

## 2025-06-19 PROBLEM — Z12.31 OTHER SCREENING MAMMOGRAM: Status: RESOLVED | Noted: 2023-05-30 | Resolved: 2025-06-19

## 2025-06-19 PROCEDURE — 1126F AMNT PAIN NOTED NONE PRSNT: CPT | Mod: CPTII,,, | Performed by: NURSE PRACTITIONER

## 2025-06-19 PROCEDURE — 3074F SYST BP LT 130 MM HG: CPT | Mod: CPTII,,, | Performed by: NURSE PRACTITIONER

## 2025-06-19 PROCEDURE — 4010F ACE/ARB THERAPY RXD/TAKEN: CPT | Mod: CPTII,,, | Performed by: NURSE PRACTITIONER

## 2025-06-19 PROCEDURE — 1111F DSCHRG MED/CURRENT MED MERGE: CPT | Mod: CPTII,,, | Performed by: NURSE PRACTITIONER

## 2025-06-19 PROCEDURE — 1159F MED LIST DOCD IN RCRD: CPT | Mod: CPTII,,, | Performed by: NURSE PRACTITIONER

## 2025-06-19 PROCEDURE — 3044F HG A1C LEVEL LT 7.0%: CPT | Mod: CPTII,,, | Performed by: NURSE PRACTITIONER

## 2025-06-19 PROCEDURE — 3078F DIAST BP <80 MM HG: CPT | Mod: CPTII,,, | Performed by: NURSE PRACTITIONER

## 2025-06-19 PROCEDURE — 99214 OFFICE O/P EST MOD 30 MIN: CPT | Mod: ,,, | Performed by: NURSE PRACTITIONER

## 2025-06-19 PROCEDURE — 1160F RVW MEDS BY RX/DR IN RCRD: CPT | Mod: CPTII,,, | Performed by: NURSE PRACTITIONER

## 2025-06-19 PROCEDURE — 3008F BODY MASS INDEX DOCD: CPT | Mod: CPTII,,, | Performed by: NURSE PRACTITIONER

## 2025-06-19 RX ORDER — GABAPENTIN 300 MG/1
300 CAPSULE ORAL NIGHTLY
Qty: 30 CAPSULE | Refills: 1 | Status: SHIPPED | OUTPATIENT
Start: 2025-06-19

## 2025-06-19 NOTE — PROGRESS NOTES
"Subjective:       Patient ID: Jasmyne Bone is a 64 y.o. female.    Chief Complaint: Hospital Follow Up (1 week hospital follow up)      The patient is a 64-year-old female who presents for follow-up of a TIA.  The patient went to the emergency department on June 13th and was having a chance ischemic attack.  She states she did not have hypertension.  She states it was very scary because she had numbness in her arm and could not find words that she wanted to say.  However she is completely recovered now.  She states that she is on aspirin 81 mg oral daily.    She is getting ready to have surgery on her back.  She has severe lumbar stenosis.      Review of [place] point review of systems conducted, negative except as stated in the history of present illness. See HPI for details.      Objective:      Visit Vitals  /78   Pulse 74   Temp 97.2 °F (36.2 °C)   Resp 20   Ht 5' 1" (1.549 m)   Wt 91.2 kg (201 lb)   BMI 37.98 kg/m²     Physical Exam  Vitals and nursing note reviewed.   Constitutional:       Appearance: She is obese.   HENT:      Head: Normocephalic.      Right Ear: Tympanic membrane normal.      Left Ear: Tympanic membrane normal.      Nose: Nose normal.      Mouth/Throat:      Mouth: Mucous membranes are moist.   Eyes:      Extraocular Movements: Extraocular movements intact.   Cardiovascular:      Rate and Rhythm: Normal rate and regular rhythm.      Heart sounds: No murmur heard.  Pulmonary:      Effort: Pulmonary effort is normal.      Breath sounds: Normal breath sounds.   Abdominal:      General: Bowel sounds are normal.      Palpations: Abdomen is soft.   Musculoskeletal:      Cervical back: Normal range of motion and neck supple.      Comments: Limited range of motion in the lumbar region due to pain   Skin:     General: Skin is warm and dry.   Neurological:      Mental Status: She is alert and oriented to person, place, and time.       Current Outpatient Medications   Medication " Instructions    aspirin (ECOTRIN) 81 mg, Daily    atorvastatin (LIPITOR) 40 mg, Oral, Nightly    azelastine (ASTELIN) 137 mcg, Nasal, 2 times daily    clotrimazole-betamethasone 1-0.05% (LOTRISONE) cream Topical (Top), 2 times daily    EScitalopram oxalate (LEXAPRO) 20 mg, Oral    gabapentin (NEURONTIN) 300 mg, Oral, Nightly    lisinopriL (PRINIVIL,ZESTRIL) 5 mg, Oral    omeprazole (PRILOSEC) 20 mg, Daily     has no known allergies.       Assessment:         ICD-10-CM ICD-9-CM   1. Lumbar stenosis with neurogenic claudication  M48.062 724.03   2. Chronic bilateral low back pain with bilateral sciatica  M54.42 724.2    M54.41 724.3    G89.29 338.29   3. Hypertension, unspecified type  I10 401.9   4. History of TIA (transient ischemic attack)  Z86.73 V12.54   5. Anxiety  F41.9 300.00   6. Other depression  F32.89 311   7. Class 2 severe obesity due to excess calories with serious comorbidity in adult, unspecified BMI  E66.812 278.01    E66.01           Plan:       1. Lumbar stenosis with neurogenic claudication  The patient had neurosurgery    2. Chronic bilateral low back pain with bilateral sciatica  Seeing neurosurgeon  - gabapentin (NEURONTIN) 300 MG capsule; Take 1 capsule (300 mg total) by mouth every evening.  Dispense: 30 capsule; Refill: 1    3. Hypertension, unspecified type  Hypertension  Controlled  Continue current medication-lisinopril   Return to clinic with any concerns    4. History of TIA (transient ischemic attack)  Stable    5. Anxiety  Stable  Continue Lexapro    6. Other depression  Stable  Continue Lexapro    7. Class 2 severe obesity due to excess calories with serious comorbidity in adult, unspecified BMI  Obesity  Reduced calorie diet modification  Frequent self weighing discussed  Exercise/lifestyle modification          Follow up in about 3 months (around 9/19/2025).     Future Appointments   Date Time Provider Department Center   6/23/2025  8:30 AM Corrina Gaspar PA-C [facility] [address]   7/30/2025  8:00 AM Johny Martinez MD [facility] [address]   9/18/2025  3:00 PM [facility]

## 2025-06-23 ENCOUNTER — TELEPHONE (OUTPATIENT)
Dept: NEUROSURGERY | Facility: CLINIC | Age: 65
End: 2025-06-23

## 2025-06-23 ENCOUNTER — OFFICE VISIT (OUTPATIENT)
Dept: NEUROSURGERY | Facility: CLINIC | Age: 65
End: 2025-06-23
Payer: MEDICARE

## 2025-06-23 VITALS
WEIGHT: 204 LBS | HEIGHT: 61 IN | RESPIRATION RATE: 16 BRPM | SYSTOLIC BLOOD PRESSURE: 118 MMHG | BODY MASS INDEX: 38.51 KG/M2 | DIASTOLIC BLOOD PRESSURE: 77 MMHG | HEART RATE: 59 BPM

## 2025-06-23 DIAGNOSIS — M48.062 LUMBAR STENOSIS WITH NEUROGENIC CLAUDICATION: ICD-10-CM

## 2025-06-23 DIAGNOSIS — Z78.0 POSTMENOPAUSAL: ICD-10-CM

## 2025-06-23 DIAGNOSIS — M43.16 SPONDYLOLISTHESIS OF LUMBAR REGION: Primary | ICD-10-CM

## 2025-06-23 DIAGNOSIS — M47.812 SPONDYLOSIS OF CERVICAL REGION WITHOUT MYELOPATHY OR RADICULOPATHY: ICD-10-CM

## 2025-06-23 PROCEDURE — 99215 OFFICE O/P EST HI 40 MIN: CPT | Mod: ,,, | Performed by: PHYSICIAN ASSISTANT

## 2025-06-23 PROCEDURE — 1100F PTFALLS ASSESS-DOCD GE2>/YR: CPT | Mod: CPTII,,, | Performed by: PHYSICIAN ASSISTANT

## 2025-06-23 PROCEDURE — 3078F DIAST BP <80 MM HG: CPT | Mod: CPTII,,, | Performed by: PHYSICIAN ASSISTANT

## 2025-06-23 PROCEDURE — 3044F HG A1C LEVEL LT 7.0%: CPT | Mod: CPTII,,, | Performed by: PHYSICIAN ASSISTANT

## 2025-06-23 PROCEDURE — 4010F ACE/ARB THERAPY RXD/TAKEN: CPT | Mod: CPTII,,, | Performed by: PHYSICIAN ASSISTANT

## 2025-06-23 PROCEDURE — 3288F FALL RISK ASSESSMENT DOCD: CPT | Mod: CPTII,,, | Performed by: PHYSICIAN ASSISTANT

## 2025-06-23 PROCEDURE — 1125F AMNT PAIN NOTED PAIN PRSNT: CPT | Mod: CPTII,,, | Performed by: PHYSICIAN ASSISTANT

## 2025-06-23 PROCEDURE — 3008F BODY MASS INDEX DOCD: CPT | Mod: CPTII,,, | Performed by: PHYSICIAN ASSISTANT

## 2025-06-23 PROCEDURE — 3074F SYST BP LT 130 MM HG: CPT | Mod: CPTII,,, | Performed by: PHYSICIAN ASSISTANT

## 2025-06-23 PROCEDURE — 1160F RVW MEDS BY RX/DR IN RCRD: CPT | Mod: CPTII,,, | Performed by: PHYSICIAN ASSISTANT

## 2025-06-23 PROCEDURE — 1159F MED LIST DOCD IN RCRD: CPT | Mod: CPTII,,, | Performed by: PHYSICIAN ASSISTANT

## 2025-06-23 RX ORDER — PRAVASTATIN SODIUM 10 MG/1
TABLET ORAL
COMMUNITY
Start: 2023-07-30 | End: 2025-06-23

## 2025-06-23 NOTE — PROGRESS NOTES
[facility]  History & Physical  Neurosurgery      Jasmyne Bone   [id number]   1960       SUBJECTIVE:     CHIEF COMPLAINT:  Lower back and lower extremity pain and numbness, urinary incontinence.      HPI:  Jasmyne Bone is a 64 y.o. female who presents for a hospital follow up appointment.  On 06/13/2025, the patient presented to the emergency department at [facility] due to right arm numbness, expressive aphasia, headache, and neck pain.  The patient reported two weeks prior worsening of the urinary incontinence with feeling of numbness when she wipes after going to the bathroom.  The patient was found to have both cervical foraminal stenosis as well as severe stenosis at the L4-5 level with spondylolisthesis.  Due to TIA, Dr. Stratton felt the patient's aspirin should not be discontinued for 1 week.  Dr. Schmidt recommended she be followed closely as an outpatient to explore surgical options.    The patient complains of lower back pain with pain radiating into the sacral region and bilateral buttocks.  With standing, she developed numbness across the hips and down the lateral thighs, lateral lower legs, and lateral foot into the 3rd, 4th, and 5th toes.  She experiences this with standing or walking 15 20 minutes.  She sits and rest to relieve the numbness.  Thereafter, she is able to be active once again.  The patient's symptoms have been ongoing for 2-3 years.  She developed saddle numbness approximately 1 year ago.  She describes being unable to feel herself when she wipes after using the bathroom.  One month ago, she developed urinary incontinence.  She is not able to feel when she is losing urine.  She wears a pad.  She also wets herself at night.  She has difficulties with her balance and feels quite unsteady at times.  She has not undergone a course of physical therapy.  She stopped taking gabapentin over 1 year ago.  She found it did help in allowing her to sleep better at night.  She only  took the medication at night.  She tells me she has a prescription for gabapentin at the pharmacy which he will  to resume.    She denies numbness in the right upper extremity.  The numbness resolved with the TIA symptoms.      [facility] in [place].  Dr. Bradley Laura.  She is to see her cardiologist tomorrow.  She has undergone cardiac testing recently.  Plan was to have a heart monitor placed due to a cardiac arrhythmia tomorrow.      Past Medical History:   Diagnosis Date    GERD (gastroesophageal reflux disease)     Hyperlipidemia     Hypertension     Personal history of colonic polyps 2018       Past Surgical History:   Procedure Laterality Date    COLONOSCOPY  2018    Damien Valverde MD    TONSILLECTOMY         Family History   Problem Relation Name Age of Onset    Cancer Mother      COPD Father         Social History     Socioeconomic History    Marital status:    Tobacco Use    Smoking status: Former     Current packs/day: 0.00     Types: Cigarettes     Quit date:      Years since quittin.5     Passive exposure: Past    Smokeless tobacco: Never   Substance and Sexual Activity    Alcohol use: Yes     Alcohol/week: 5.0 - 6.0 standard drinks of alcohol     Types: 5 - 6 Glasses of wine per week    Drug use: Never    Sexual activity: Not Currently     Social Drivers of Health     Financial Resource Strain: Low Risk  (6/15/2025)    Overall Financial Resource Strain (CARDIA)     Difficulty of Paying Living Expenses: Not very hard   Food Insecurity: Food Insecurity Present (6/15/2025)    Hunger Vital Sign     Worried About Running Out of Food in the Last Year: Sometimes true     Ran Out of Food in the Last Year: Never true   Transportation Needs: No Transportation Needs (6/15/2025)    PRAPARE - Transportation     Lack of Transportation (Medical): No     Lack of Transportation (Non-Medical): No   Physical Activity: Unknown (6/15/2025)    Exercise Vital Sign     Days of Exercise per Week: 0  "days   Stress: No Stress Concern Present (6/15/2025)    [employer] - Occupational Stress Questionnaire     Feeling of Stress : Only a little   Housing Stability: Low Risk  (6/15/2025)    Housing Stability Vital Sign     Unable to Pay for Housing in the Last Year: No     Number of Times Moved in the Last Year: 0     Homeless in the Last Year: No       Review of patient's allergies indicates:  No Known Allergies     Current Outpatient Medications   Medication Instructions    aspirin (ECOTRIN) 81 mg, Daily    atorvastatin (LIPITOR) 40 mg, Oral, Nightly    clotrimazole-betamethasone 1-0.05% (LOTRISONE) cream Topical (Top), 2 times daily    EScitalopram oxalate (LEXAPRO) 20 mg, Oral    gabapentin (NEURONTIN) 300 mg, Oral, Nightly    lisinopriL (PRINIVIL,ZESTRIL) 5 mg, Oral    omeprazole (PRILOSEC) 20 mg, Daily       Review of Systems   Constitutional:  Negative for chills and fever.   HENT:  Negative for nosebleeds and sore throat.    Eyes:  Negative for pain and visual disturbance.   Respiratory:  Negative for cough, chest tightness and shortness of breath.    Cardiovascular:  Negative for chest pain.   Gastrointestinal:  Negative for diarrhea, nausea and vomiting.   Genitourinary:  Negative for difficulty urinating, dysuria and hematuria.   Musculoskeletal:  Positive for back pain, gait problem and neck pain. Negative for myalgias.   Skin:  Negative for rash.   Neurological:  Positive for weakness and numbness. Negative for dizziness, facial asymmetry and headaches.   Psychiatric/Behavioral:  Negative for confusion and sleep disturbance. The patient is not nervous/anxious.        OBJECTIVE:       Visit Vitals  /77 (BP Location: Right arm, Patient Position: Sitting)   Pulse (!) 59   Resp 16   Ht 5' 1" (1.549 m)   Wt 92.5 kg (204 lb)   BMI 38.55 kg/m²        General:  Pleasant, Well-groomed, overweight.    CV:  Neck is supple.  There are no carotid bruits.  Heart has a regular rate and " rhythm.    Lungs:  Clear to auscultation.   Breathing is quiet with non-labored respirations.    Abdomen:  Soft, nontender, nondistended    Musculoskeletal:  Lumbar ROM:  Full with flexion.  Severely limited with extension.  Lower back pain is markedly increased with extension.  Straight leg raise is negative bilaterally.  Crossed straight leg raise is negative bilaterally.  Hip rotation is negative bilaterally.    Neurological:    Alert and oriented to person, place, time, and situation.  Muscle strength against resistance:  Strength  Deltoids Biceps Triceps Wrist Extension Wrist Flexion Intrinsics   Upper: R 5/5 5/5 5/5 5/5  5/5    L 5/5 5/5 5/5 5/5  5/5     Iliopsoas Quadriceps Knee  Flexion Tibialis  anterior Gastro- cnemius EHL   Lower: R 5/5 5/5 5/5 5/5 5/5 5/5    L 5/5 5/5 5/5 5/5 5/5 5/5   Sensation is intact to primary modalities in bilateral upper and lower extremities with the exception of being diminished along the right L5 dermatome.  Reflexes:   Right Left   Triceps 0 0   Biceps 2+ 1+   Brachioradialis 2+ 2+   Patellar 2+ 2+   Achilles 0 1+   Jensen's sign is negative bilaterally.  Toes are equivocal to Babinski.  There is no clonus at the ankles.  Gait is a little unsteady.   She is able to walk on heels and toes demonstrating difficulty with her balance.    Imaging:  All pertinent neuroimaging independently reviewed. Discussed these findings in detail with the patient.  Lumbar x-rays from 06/14/2025 shows grade 2 spondylolisthesis at L4-5 that does not change with flexion or extension.  There is increased anterolisthesis when standing as compared to the lumbar MRI from 06/14/2025 when she is lying down.  There is severe stenosis at L4-5 secondary to uncovering of the disc and ligamentous/facet hypertrophy.  There is also moderate central stenosis at L3-4 on a multifactorial basis.  Lumbar CT also from 06/14/2025 shows vacuum disc phenomenon at L4-5 and L5-S1.    ASSESSMENT:     1. Spondylolisthesis  of lumbar region        2. Lumbar stenosis with neurogenic claudication        3. Postmenopausal  DXA Bone Density Axial Skeleton 1 or more sites    DXA Bone Density Appendicular Skeleton      4. Spondylosis of cervical region without myelopathy or radiculopathy          PLAN:     The patient and her situation was discussed with Dr. Contreras.  Options were discussed at length with the patient.  It is quite concerning that she has saddle numbness and urinary incontinence.  However, she is currently undergoing cardiac workup for an arrhythmia.  She is appropriate for L4-5 decompression and fusion.  She may need additional levels included.  Risks, benefits, and possible complications were discussed with the patient.  All of her questions were answered.  We will have her obtain bone density study to assess her bone quality.  Once she has been cleared from a cardiac standpoint, we will proceed with scheduling surgery.  She will return to see Dr. Contreras at that time.      A total of 33 minutes was spent face-to-face with the patient during this encounter.  Over half of that time was spent on counseling and coordination of care.  An additional 20+ minutes were used to review the patient's chart including Lumbar MRI, x-ray, and CT images and reports, cervical, thoracic, and brain MRI images, notes from ED, Dr. Martinez, and hospitalist, and work on office note.      Corrina Gaspar PA-C      Disclaimer:  This note is prepared using voice recognition software and as such is likely to have errors despite attempts at proofreading.

## 2025-06-25 ENCOUNTER — PATIENT OUTREACH (OUTPATIENT)
Dept: ADMINISTRATIVE | Facility: OTHER | Age: 65
End: 2025-06-25
Payer: MEDICARE

## 2025-06-25 ENCOUNTER — HOSPITAL ENCOUNTER (OUTPATIENT)
Dept: RADIOLOGY | Facility: HOSPITAL | Age: 65
Discharge: HOME | End: 2025-06-25
Attending: PHYSICIAN ASSISTANT
Payer: MEDICARE

## 2025-06-25 ENCOUNTER — TELEPHONE (OUTPATIENT)
Dept: ADMINISTRATIVE | Facility: CLINIC | Age: 65
End: 2025-06-25
Payer: MEDICARE

## 2025-06-25 DIAGNOSIS — Z78.0 POSTMENOPAUSAL: ICD-10-CM

## 2025-06-25 PROCEDURE — 77080 DXA BONE DENSITY AXIAL: CPT | Mod: TC

## 2025-06-25 NOTE — PROGRESS NOTES
Pt does not qualify for any resources due to income. Sending pantry food locations in pt portal. I will continue to follow on [facility] platform.

## 2025-06-25 NOTE — PROGRESS NOTES
CHW - Initial Contact    This Community Health Worker completed OR updated the Social Determinant of Health questionnaire with patient via telephone today.    Pt identified barriers of most importance are: Pt does not qualify for any resources due to income at this time. Pantry food is all that is available. Sending in pt portal.  Referrals to community agencies completed with patient/caregiver consent outside of [facility] include: yes  Referrals were put through [facility] - no:   Support and Services: Pantry Food  Other information discussed the patient needs / wants help with: SDOH   Follow up required: yes

## 2025-06-27 ENCOUNTER — TELEPHONE (OUTPATIENT)
Dept: NEUROSURGERY | Facility: CLINIC | Age: 65
End: 2025-06-27
Payer: MEDICARE

## 2025-06-27 NOTE — TELEPHONE ENCOUNTER
I spoke to the patient.  She has osteopenia.  I advised her to obtain a calcium, vitamin-D, and magnesium supplement to take daily.  She voiced understanding.    She reports increased burning pain when she is lying at night.  She is having a difficult time falling asleep due to this.  I advised her to increase her gabapentin from 1 300 mg at night 2-300 mg at night.  She voiced understanding.    She was seen by her cardiologist.  She is undergoing cardiac monitoring.  She will return to see the cardiologist on 07/29/2025 to go over the results.  She will let us know as soon as she is aware that she is cleared for surgery.    Lorraine, please put her in my schedule on 07/30/2025 at 10:00 a.m..  This will be after she sees Dr. Johny Martinez.

## 2025-06-30 NOTE — TELEPHONE ENCOUNTER
----- Message from Corrina Gaspar PA-C sent at 6/23/2025  9:34 AM CDT -----  Regarding: cardiac clearance  [facility] in [place].  Dr. Bradley Laura.    She has terrible stenosis with saddle numbness and urinary incontinence.   She is to see the cardiologist tomorrow.  Please send clearance form.  
Cardiac clearance request faxed to [facility].  
Noted, will monitor.   
No

## 2025-07-11 ENCOUNTER — TELEPHONE (OUTPATIENT)
Dept: NEUROSURGERY | Facility: CLINIC | Age: 65
End: 2025-07-11
Payer: MEDICARE

## 2025-07-11 DIAGNOSIS — G89.29 CHRONIC BILATERAL LOW BACK PAIN WITH BILATERAL SCIATICA: ICD-10-CM

## 2025-07-11 DIAGNOSIS — M54.41 CHRONIC BILATERAL LOW BACK PAIN WITH BILATERAL SCIATICA: ICD-10-CM

## 2025-07-11 DIAGNOSIS — M54.42 CHRONIC BILATERAL LOW BACK PAIN WITH BILATERAL SCIATICA: ICD-10-CM

## 2025-07-11 RX ORDER — GABAPENTIN 300 MG/1
600 CAPSULE ORAL NIGHTLY
Qty: 60 CAPSULE | Refills: 1 | Status: SHIPPED | OUTPATIENT
Start: 2025-07-11

## 2025-07-30 ENCOUNTER — OFFICE VISIT (OUTPATIENT)
Dept: NEUROSURGERY | Facility: CLINIC | Age: 65
End: 2025-07-30
Payer: MEDICARE

## 2025-07-30 ENCOUNTER — OFFICE VISIT (OUTPATIENT)
Dept: NEUROLOGY | Facility: CLINIC | Age: 65
End: 2025-07-30
Payer: MEDICARE

## 2025-07-30 VITALS
BODY MASS INDEX: 40.02 KG/M2 | WEIGHT: 212 LBS | SYSTOLIC BLOOD PRESSURE: 114 MMHG | DIASTOLIC BLOOD PRESSURE: 70 MMHG | HEIGHT: 61 IN

## 2025-07-30 VITALS
SYSTOLIC BLOOD PRESSURE: 125 MMHG | HEART RATE: 67 BPM | BODY MASS INDEX: 39.84 KG/M2 | WEIGHT: 211 LBS | HEIGHT: 61 IN | RESPIRATION RATE: 16 BRPM | DIASTOLIC BLOOD PRESSURE: 61 MMHG

## 2025-07-30 DIAGNOSIS — G89.29 CHRONIC BILATERAL LOW BACK PAIN WITH BILATERAL SCIATICA: ICD-10-CM

## 2025-07-30 DIAGNOSIS — M54.41 CHRONIC BILATERAL LOW BACK PAIN WITH BILATERAL SCIATICA: ICD-10-CM

## 2025-07-30 DIAGNOSIS — G47.33 OBSTRUCTIVE SLEEP APNEA: ICD-10-CM

## 2025-07-30 DIAGNOSIS — N39.498 OTHER URINARY INCONTINENCE: ICD-10-CM

## 2025-07-30 DIAGNOSIS — M48.062 LUMBAR STENOSIS WITH NEUROGENIC CLAUDICATION: ICD-10-CM

## 2025-07-30 DIAGNOSIS — M54.42 CHRONIC BILATERAL LOW BACK PAIN WITH BILATERAL SCIATICA: ICD-10-CM

## 2025-07-30 DIAGNOSIS — M43.16 SPONDYLOLISTHESIS OF LUMBAR REGION: ICD-10-CM

## 2025-07-30 DIAGNOSIS — M85.852 OSTEOPENIA OF LEFT HIP: ICD-10-CM

## 2025-07-30 DIAGNOSIS — M48.062 LUMBAR STENOSIS WITH NEUROGENIC CLAUDICATION: Primary | ICD-10-CM

## 2025-07-30 DIAGNOSIS — G45.9 TIA (TRANSIENT ISCHEMIC ATTACK): Primary | ICD-10-CM

## 2025-07-30 PROCEDURE — 1126F AMNT PAIN NOTED NONE PRSNT: CPT | Mod: CPTII,S$GLB,, | Performed by: PSYCHIATRY & NEUROLOGY

## 2025-07-30 PROCEDURE — 3044F HG A1C LEVEL LT 7.0%: CPT | Mod: CPTII,S$GLB,, | Performed by: PSYCHIATRY & NEUROLOGY

## 2025-07-30 PROCEDURE — 3288F FALL RISK ASSESSMENT DOCD: CPT | Mod: CPTII,,, | Performed by: PHYSICIAN ASSISTANT

## 2025-07-30 PROCEDURE — 1160F RVW MEDS BY RX/DR IN RCRD: CPT | Mod: CPTII,,, | Performed by: PHYSICIAN ASSISTANT

## 2025-07-30 PROCEDURE — 99999 PR PBB SHADOW E&M-EST. PATIENT-LVL III: CPT | Mod: PBBFAC,,, | Performed by: PSYCHIATRY & NEUROLOGY

## 2025-07-30 PROCEDURE — 3044F HG A1C LEVEL LT 7.0%: CPT | Mod: CPTII,,, | Performed by: PHYSICIAN ASSISTANT

## 2025-07-30 PROCEDURE — 3078F DIAST BP <80 MM HG: CPT | Mod: CPTII,,, | Performed by: PHYSICIAN ASSISTANT

## 2025-07-30 PROCEDURE — 1101F PT FALLS ASSESS-DOCD LE1/YR: CPT | Mod: CPTII,S$GLB,, | Performed by: PSYCHIATRY & NEUROLOGY

## 2025-07-30 PROCEDURE — 4010F ACE/ARB THERAPY RXD/TAKEN: CPT | Mod: CPTII,S$GLB,, | Performed by: PSYCHIATRY & NEUROLOGY

## 2025-07-30 PROCEDURE — 1159F MED LIST DOCD IN RCRD: CPT | Mod: CPTII,,, | Performed by: PHYSICIAN ASSISTANT

## 2025-07-30 PROCEDURE — 3078F DIAST BP <80 MM HG: CPT | Mod: CPTII,S$GLB,, | Performed by: PSYCHIATRY & NEUROLOGY

## 2025-07-30 PROCEDURE — 3074F SYST BP LT 130 MM HG: CPT | Mod: CPTII,S$GLB,, | Performed by: PSYCHIATRY & NEUROLOGY

## 2025-07-30 PROCEDURE — 3288F FALL RISK ASSESSMENT DOCD: CPT | Mod: CPTII,S$GLB,, | Performed by: PSYCHIATRY & NEUROLOGY

## 2025-07-30 PROCEDURE — 99215 OFFICE O/P EST HI 40 MIN: CPT | Mod: ,,, | Performed by: PHYSICIAN ASSISTANT

## 2025-07-30 PROCEDURE — 3074F SYST BP LT 130 MM HG: CPT | Mod: CPTII,,, | Performed by: PHYSICIAN ASSISTANT

## 2025-07-30 PROCEDURE — 3008F BODY MASS INDEX DOCD: CPT | Mod: CPTII,S$GLB,, | Performed by: PSYCHIATRY & NEUROLOGY

## 2025-07-30 PROCEDURE — 1101F PT FALLS ASSESS-DOCD LE1/YR: CPT | Mod: CPTII,,, | Performed by: PHYSICIAN ASSISTANT

## 2025-07-30 PROCEDURE — 1126F AMNT PAIN NOTED NONE PRSNT: CPT | Mod: CPTII,,, | Performed by: PHYSICIAN ASSISTANT

## 2025-07-30 PROCEDURE — 4010F ACE/ARB THERAPY RXD/TAKEN: CPT | Mod: CPTII,,, | Performed by: PHYSICIAN ASSISTANT

## 2025-07-30 PROCEDURE — 1159F MED LIST DOCD IN RCRD: CPT | Mod: CPTII,S$GLB,, | Performed by: PSYCHIATRY & NEUROLOGY

## 2025-07-30 PROCEDURE — 99214 OFFICE O/P EST MOD 30 MIN: CPT | Mod: S$GLB,,, | Performed by: PSYCHIATRY & NEUROLOGY

## 2025-07-30 PROCEDURE — 3008F BODY MASS INDEX DOCD: CPT | Mod: CPTII,,, | Performed by: PHYSICIAN ASSISTANT

## 2025-07-30 RX ORDER — MAGNESIUM 200 MG
TABLET ORAL ONCE
COMMUNITY

## 2025-07-30 NOTE — PROGRESS NOTES
[facility]  History & Physical  Neurosurgery      Jasmyne Bone   [id number]   1960       SUBJECTIVE:     CHIEF COMPLAINT:  Lower back pain, leg numbness and weakness.      HPI:  Jasmyne Bone is a 64 y.o. female who presents for follow up appointment.  The patient was last seen by me on 06/23/2025.  On 06/13/2025, the patient presented to the emergency department at [facility] [place] due to right arm numbness, expressive aphasia, headache, and neck pain.  The patient reported two weeks prior worsening of the urinary incontinence with feeling of numbness when she wipes after going to the bathroom.  The patient was found to have both cervical foraminal stenosis as well as severe stenosis at the L4-5 level with spondylolisthesis.  Due to TIA, Dr. Stratton felt the patient's aspirin should not be discontinued for 1 week.  Dr. Schmidt recommended she be followed closely as an outpatient to explore surgical options.     The patient reports she is having a good day today.  She denies pain at this time.  With activity, the patient experiences lower back pain with pain radiating to bilateral hips and lateral/posterior lower legs.  The further she walks, she developed weakness and progressive numbness in her legs.  She feels she is able to walk as far as she desires.  She walks as quickly as possible to get there before her legs become weak.  She continues with saddle numbness intermittently.  She notes this has a gone on for quite some time.  Two to three months ago, she developed urinary incontinence.  The incontinence is occurring infrequently now.      She has been seen by Dr. Martinez this morning.  She is okay to discontinue the aspirin if needed.  She was also seen by her cardiologist.  Plans are for nuclear medicine study this afternoon.  Thereafter, she will be able to be assessed for surgery clearance.  Since we last spoke, she added calcium, magnesium, and vitamin-D supplements.       Cardiology     [place]      Past Medical History:   Diagnosis Date    Confusion     GERD (gastroesophageal reflux disease)     Hyperlipidemia     Hypertension     Numbness     Personal history of colonic polyps 2018       Past Surgical History:   Procedure Laterality Date    COLONOSCOPY  2018    Damien Valverde MD    TONSILLECTOMY         Family History   Problem Relation Name Age of Onset    Cancer Mother      COPD Father         Social History     Socioeconomic History    Marital status:    Tobacco Use    Smoking status: Former     Current packs/day: 0.00     Types: Cigarettes     Quit date:      Years since quittin.6     Passive exposure: Past    Smokeless tobacco: Never   Substance and Sexual Activity    Alcohol use: Yes     Alcohol/week: 5.0 - 6.0 standard drinks of alcohol     Types: 5 - 6 Glasses of wine per week    Drug use: Never    Sexual activity: Not Currently     Social Drivers of Health     Financial Resource Strain: Medium Risk (2025)    Overall Financial Resource Strain (CARDIA)     Difficulty of Paying Living Expenses: Somewhat hard   Food Insecurity: Food Insecurity Present (2025)    Hunger Vital Sign     Worried About Running Out of Food in the Last Year: Sometimes true     Ran Out of Food in the Last Year: Sometimes true   Transportation Needs: No Transportation Needs (2025)    PRAPARE - Transportation     Lack of Transportation (Medical): No     Lack of Transportation (Non-Medical): No   Physical Activity: Inactive (2025)    Exercise Vital Sign     Days of Exercise per Week: 0 days     Minutes of Exercise per Session: 0 min   Stress: No Stress Concern Present (6/15/2025)    [employer] [employer] - Occupational Stress Questionnaire     Feeling of Stress : Only a little   Housing Stability: Low Risk  (2025)    Housing Stability Vital Sign     Unable to Pay for Housing in the Last Year: No     Number of Times Moved in the Last Year: 0      "Homeless in the Last Year: No       Review of patient's allergies indicates:  No Known Allergies     Current Outpatient Medications   Medication Instructions    aspirin (ECOTRIN) 81 mg, Daily    atorvastatin (LIPITOR) 40 mg, Oral, Nightly    clotrimazole-betamethasone 1-0.05% (LOTRISONE) cream Topical (Top), 2 times daily    EScitalopram oxalate (LEXAPRO) 20 mg, Oral    gabapentin (NEURONTIN) 600 mg, Oral, Nightly    lisinopriL (PRINIVIL,ZESTRIL) 5 mg, Oral    magnesium 200 mg Tab Once    multivit-minerals/folic acid (CENTRUM ADULT 50 PLUS ORAL) Take by mouth.    omeprazole (PRILOSEC) 20 mg, Daily        Review of Systems   Constitutional:  Negative for chills and fever.   HENT:  Negative for nosebleeds and sore throat.    Eyes:  Negative for pain and visual disturbance.   Respiratory:  Negative for cough, chest tightness and shortness of breath.    Cardiovascular:  Negative for chest pain.   Gastrointestinal:  Negative for diarrhea, nausea and vomiting.   Genitourinary:  Negative for difficulty urinating, dysuria and hematuria.   Musculoskeletal:  Positive for back pain, gait problem and neck pain. Negative for myalgias.   Skin:  Negative for rash.   Neurological:  Positive for weakness and numbness. Negative for dizziness, facial asymmetry and headaches.   Psychiatric/Behavioral:  Negative for confusion and sleep disturbance. The patient is not nervous/anxious.        OBJECTIVE:       Visit Vitals  /61 (BP Location: Left arm, Patient Position: Sitting)   Pulse 67   Resp 16   Ht 5' 1" (1.549 m)   Wt 95.7 kg (211 lb)   BMI 39.87 kg/m²        General:  Pleasant, Well-groomed, overweight.    Lungs:  Breathing is quiet with non-labored respirations.    Musculoskeletal:   Straight leg raise is negative bilaterally.  Crossed straight leg raise is negative bilaterally.  Hip rotation is negative bilaterally.    Neurological:  The patient is awake, alert, and oriented in all 4 spheres.  Muscle strength against " resistance:    Iliopsoas Quadriceps Knee  Flexion Tibialis  anterior Gastro- cnemius EHL   Lower: R 5/5 5/5 5/5 5/5 5/5 5/5    L 5/5 5/5 5/5 5/5 5/5 5/5   Sensation is intact to primary modalities in bilateral upper and lower extremities with the exception of being diminished along the right L5 dermatome.   Toes are equivocal to Babinski.  There is no clonus at the ankles.   Reflexes:   Right Left   Triceps     Biceps     Brachioradialis     Patellar 2+ 2+   Achilles 0 1+   Gait is normal.  He is able to walk on heels and toes      Imaging:  All pertinent neuroimaging independently reviewed. Discussed these findings in detail with the patient.      ASSESSMENT:     1. Lumbar stenosis with neurogenic claudication  CT Lumbar Spine Without Contrast w/Mazor Protocol    CANCELED: CT Lumbar Spine Without Contrast w/Mazor Protocol      2. Spondylolisthesis of lumbar region        3. Osteopenia of left hip          PLAN:     The patient was seen and examined by Dr. Contreras.  Options were discussed at length.  Risks, benefits, and possible complications were discussed.  All of her questions were answered.  Plan is for L4-5 decompression and posterolateral fusion with instrumentation once she has been cleared from a cardiac standpoint.    LUMBAR SPONDYLOLISTHESIS AND SPINAL STENOSIS:  - Reviewed imaging studies showing degenerative changes at L4-L5 and L5-S1 levels, with spondylolisthesis at L4-L5.  - Noted significant narrowing of spinal canal at L4-L5 due to spondylolisthesis, joint overgrowth, and ligament changes, correlating with symptoms.  - Determined surgical intervention necessary, involving wide decompression and stabilization with screws and rods to prevent worsening of slip and instability.  - Discussed surgical procedure, including decompression and stabilization techniques.  - Outlined potential risks and complications of the proposed surgery, including bleeding, infection, nerve damage, CSF leak, need for  additional surgery, hardware failure, and fusion complications.  - Provided information on expected hospital stay duration and initial recovery period.  - Advised on the variable timeline for symptom improvement, particularly for sensory and urinary symptoms.    OBESITY MANAGEMENT:  - Considered BMI (39.87) in relation to surgical outcomes and healing.  - Patient to cut back on portion sizes and limit snacking between meals to address weight concerns.    CARDIOVASCULAR HEALTH:  - Awaiting cardiac clearance before finalizing surgical plan.  - Ordered nuclear stress test (to be completed prior to surgical clearance).    MEDICATION MANAGEMENT:  - Discontinued Aspirin, multivitamin (due to vitamin C and E content), fish oil for 7 days prior to surgery.    PRE-OPERATIVE TESTING:  - Ordered pre-operative labs.  - Ordered pre-operative urinalysis.  - Ordered pre-operative chest XR.  - Complete pre-operative testing at [facility].    FOLLOW-UP:  - Follow up to sign surgical consents and discuss detailed preparation for hospital stay.          A total of 28 minutes was spent face-to-face with the patient during this encounter.  Over half of that time was spent on counseling and coordination of care.  An additional 15+ minutes were used to review the patient's chart including lumbar MRI, CT, and x-ray images, discussion with Dr. Contreras, cardiology notes, and work on office note.      Corrina Gaspar PA-C      This note was generated with the assistance of ambient listening technology. Verbal consent was obtained by the patient and accompanying visitor(s) for the recording of patient appointment to facilitate this note. I attest to having reviewed and edited the generated note for accuracy, though some syntax or spelling errors may persist. Please contact the author of this note for any clarification.

## 2025-07-30 NOTE — PROGRESS NOTES
Subjective     Patient ID: Jasmyne Bone is a 64 y.o. y.o. female    Chief Complaint: Hospital follow-up    HPI  Here for post hospitalization follow up. Was hospitalized after presenting with right arm numbness, urinary incontinence, and paraesthesia of groin.   Reports that symptoms have improved since discharge. Reports no acute symptoms. However, does report that paraesthesias of groin and radicular symptoms persist.   Reports compliance with ASA and statin   Undergoing NST today with Cardiology   Has appointment today with Neurosurgery     Imaging:     MRI brain 6/13/25: No acute abnormality. Mild chronic microvascular ischemic changes.    MRI Cervical spine 6/14/25: Mild degenerative narrowing of the spinal canal at C4-C7. Multilevel neural foraminal stenoses as described. No cord signal abnormality or abnormal enhancement.    MRI Thoracic spine 6/15/25: Mild degenerative narrowing of the spinal canal at T8-T12. Moderate neural foraminal narrowing bilaterally at T9-T11. No cord signal abnormality.    MRI Lumbar spine 6/15/25:  Severe degenerative spinal canal stenosis at L4-5, moderate at L3-L4, mild at L1-L3. Multiple neural foraminal stenoses as described. No abnormal enhancement.    Review of Systems  The remainder of the 14 system ROS is noncontributory or negative unless mentioned/reviewed above.     Objective     Physical Exam   Mental Status: Alert and oriented x3. Language is fluent with good comprehension.     Cranial Nerve: Pupils are equal, round, and reactive to light. Visual fields are intact to confrontation. Ocular movements are intact. Face is symmetric at rest and with activation with intact sensation throughout. Hearing intact to finger rub bilaterally. Muscles of tongue and palate activate symmetrically. No dysarthria. Strength is full in sternocleidomastoid and trapezius bilaterally.     Motor: Muscle bulk and tone are normal. Strength is 5/5 in all four extremities both proximally and  distally. Intact fine motor movements bilaterally. There is no pronator drift or satelliting on arm roll.     Sensory: Diminished sensation of lateral lower extremities. Sensation is intact to light touch, pinprick, vibration, and proprioception elsewhere. Romberg is negative.     Reflexes: 1+ achilles bilaterally. 2+ and symmetric at the biceps, triceps, brachioradialis, patella bilaterally. Plantar response is flexor bilaterally.     Coordination: No dysmetria on finger-nose-finger or heel-knee-shin. Normal rapid alternating movements. Fast finger tapping with normal amplitude and speed.     Gait: Narrow based with normal stride length and good arm swing bilaterally.     Assessment and Plan     Jasmyne was seen today for numbness.    Diagnoses and all orders for this visit:    TIA (transient ischemic attack)  -     Home Sleep Study; Future    Obstructive sleep apnea  -     Home Sleep Study; Future    Lumbar stenosis with neurogenic claudication    Chronic bilateral low back pain with bilateral sciatica      - can hold ASA preop as indicated  - has upcoming appointment with Neurosurgery today to discuss surgical intervention. However, currently undergoing risk stratification with Cardiology  - will order HST given history. Will follow back up in clinic if CPAP is indicated     Follow up if symptoms worsen or fail to improve.    Morris Ricardo, DO  [facility] PGY-3

## 2025-07-31 ENCOUNTER — PATIENT MESSAGE (OUTPATIENT)
Dept: SLEEP MEDICINE | Facility: HOSPITAL | Age: 65
End: 2025-07-31
Payer: MEDICARE

## 2025-08-06 ENCOUNTER — HOSPITAL ENCOUNTER (OUTPATIENT)
Dept: RADIOLOGY | Facility: HOSPITAL | Age: 65
Discharge: HOME | End: 2025-08-06
Attending: STUDENT IN AN ORGANIZED HEALTH CARE EDUCATION/TRAINING PROGRAM
Payer: MEDICARE

## 2025-08-06 ENCOUNTER — OFFICE VISIT (OUTPATIENT)
Dept: NEUROSURGERY | Facility: CLINIC | Age: 65
End: 2025-08-06
Payer: MEDICARE

## 2025-08-06 ENCOUNTER — PROCEDURE VISIT (OUTPATIENT)
Dept: SLEEP MEDICINE | Facility: HOSPITAL | Age: 65
End: 2025-08-06
Attending: PSYCHIATRY & NEUROLOGY
Payer: MEDICARE

## 2025-08-06 ENCOUNTER — TELEPHONE (OUTPATIENT)
Dept: NEUROSURGERY | Facility: CLINIC | Age: 65
End: 2025-08-06

## 2025-08-06 VITALS
WEIGHT: 213.19 LBS | SYSTOLIC BLOOD PRESSURE: 116 MMHG | DIASTOLIC BLOOD PRESSURE: 74 MMHG | HEIGHT: 61 IN | BODY MASS INDEX: 40.25 KG/M2 | HEART RATE: 65 BPM | RESPIRATION RATE: 16 BRPM

## 2025-08-06 DIAGNOSIS — D68.9 COAGULATION DEFECT: ICD-10-CM

## 2025-08-06 DIAGNOSIS — N39.498 OTHER URINARY INCONTINENCE: ICD-10-CM

## 2025-08-06 DIAGNOSIS — M48.062 LUMBAR STENOSIS WITH NEUROGENIC CLAUDICATION: ICD-10-CM

## 2025-08-06 DIAGNOSIS — M48.062 LUMBAR STENOSIS WITH NEUROGENIC CLAUDICATION: Primary | ICD-10-CM

## 2025-08-06 DIAGNOSIS — G47.33 OBSTRUCTIVE SLEEP APNEA: ICD-10-CM

## 2025-08-06 DIAGNOSIS — G45.9 TIA (TRANSIENT ISCHEMIC ATTACK): ICD-10-CM

## 2025-08-06 DIAGNOSIS — K21.9 GASTROESOPHAGEAL REFLUX DISEASE, UNSPECIFIED WHETHER ESOPHAGITIS PRESENT: ICD-10-CM

## 2025-08-06 DIAGNOSIS — Z01.818 PREOPERATIVE TESTING: ICD-10-CM

## 2025-08-06 DIAGNOSIS — I10 HYPERTENSION, UNSPECIFIED TYPE: ICD-10-CM

## 2025-08-06 PROCEDURE — G0399 HOME SLEEP TEST/TYPE 3 PORTA: HCPCS

## 2025-08-06 PROCEDURE — 72131 CT LUMBAR SPINE W/O DYE: CPT | Mod: TC

## 2025-08-06 NOTE — TELEPHONE ENCOUNTER
----- Message from Nurse Pichardo sent at 6/30/2025  9:39 AM CDT -----  Regarding: CARDIAC CLEARANCE-   Kylee Sandoval, RN routed this conversation to [facility] Neurosurgery Nurse  (Selected Message)  Kylee Sandoval RN  AP    6/23/25 11:52 AM  Note  Cardiac clearance request faxed to [facility].      Kylee Sandoval RN  AP    6/23/25 11:52 AM  Note  ----- Message from Corrina Gaspar PA-C sent at 6/23/2025  9:34 AM CDT -----  Regarding: cardiac clearance  [facility] in [place].  Dr. Bradley Laura.     She has terrible stenosis with saddle numbness and urinary incontinence.   She is to see the cardiologist tomorrow.  Please send clearance form.

## 2025-08-07 ENCOUNTER — HOSPITAL ENCOUNTER (OUTPATIENT)
Dept: RADIOLOGY | Facility: HOSPITAL | Age: 65
Discharge: HOME | End: 2025-08-07
Payer: MEDICARE

## 2025-08-07 DIAGNOSIS — I10 HYPERTENSION, UNSPECIFIED TYPE: ICD-10-CM

## 2025-08-07 DIAGNOSIS — Z01.818 PREOPERATIVE TESTING: ICD-10-CM

## 2025-08-07 DIAGNOSIS — K21.9 GASTROESOPHAGEAL REFLUX DISEASE, UNSPECIFIED WHETHER ESOPHAGITIS PRESENT: ICD-10-CM

## 2025-08-07 DIAGNOSIS — M48.062 LUMBAR STENOSIS WITH NEUROGENIC CLAUDICATION: ICD-10-CM

## 2025-08-07 PROCEDURE — 71046 X-RAY EXAM CHEST 2 VIEWS: CPT | Mod: TC

## 2025-08-13 ENCOUNTER — ANESTHESIA EVENT (OUTPATIENT)
Dept: SURGERY | Facility: HOSPITAL | Age: 65
End: 2025-08-13
Payer: MEDICARE

## 2025-08-15 DIAGNOSIS — M54.42 CHRONIC BILATERAL LOW BACK PAIN WITH BILATERAL SCIATICA: ICD-10-CM

## 2025-08-15 DIAGNOSIS — M54.41 CHRONIC BILATERAL LOW BACK PAIN WITH BILATERAL SCIATICA: ICD-10-CM

## 2025-08-15 DIAGNOSIS — G89.29 CHRONIC BILATERAL LOW BACK PAIN WITH BILATERAL SCIATICA: ICD-10-CM

## 2025-08-15 RX ORDER — GABAPENTIN 300 MG/1
600 CAPSULE ORAL NIGHTLY
Qty: 60 CAPSULE | Refills: 1 | Status: SHIPPED | OUTPATIENT
Start: 2025-08-15

## 2025-08-18 DIAGNOSIS — M48.062 LUMBAR STENOSIS WITH NEUROGENIC CLAUDICATION: Primary | ICD-10-CM

## 2025-08-19 ENCOUNTER — ANESTHESIA (OUTPATIENT)
Dept: SURGERY | Facility: HOSPITAL | Age: 65
End: 2025-08-19
Payer: MEDICARE

## 2025-08-19 ENCOUNTER — HOSPITAL ENCOUNTER (INPATIENT)
Facility: HOSPITAL | Age: 65
LOS: 3 days | Discharge: HOME | DRG: 451 | End: 2025-08-22
Attending: STUDENT IN AN ORGANIZED HEALTH CARE EDUCATION/TRAINING PROGRAM | Admitting: STUDENT IN AN ORGANIZED HEALTH CARE EDUCATION/TRAINING PROGRAM
Payer: MEDICARE

## 2025-08-19 DIAGNOSIS — M48.062 LUMBAR STENOSIS WITH NEUROGENIC CLAUDICATION: Primary | ICD-10-CM

## 2025-08-19 DIAGNOSIS — M54.41 CHRONIC BILATERAL LOW BACK PAIN WITH BILATERAL SCIATICA: ICD-10-CM

## 2025-08-19 DIAGNOSIS — G89.29 CHRONIC BILATERAL LOW BACK PAIN WITH BILATERAL SCIATICA: ICD-10-CM

## 2025-08-19 DIAGNOSIS — M54.9 DORSALGIA, UNSPECIFIED: ICD-10-CM

## 2025-08-19 DIAGNOSIS — M54.42 CHRONIC BILATERAL LOW BACK PAIN WITH BILATERAL SCIATICA: ICD-10-CM

## 2025-08-19 LAB
ABORH RETYPE: NORMAL
ALBUMIN SERPL-MCNC: 3.6 G/DL (ref 3.4–4.8)
ALBUMIN/GLOB SERPL: 1.5 RATIO (ref 1.1–2)
ALP SERPL-CCNC: 85 UNIT/L (ref 40–150)
ALT SERPL-CCNC: 20 UNIT/L (ref 0–55)
ANION GAP SERPL CALC-SCNC: 6 MEQ/L
ANTIBODY IDENTIFICATION: NORMAL
AST SERPL-CCNC: 15 UNIT/L (ref 11–45)
BASOPHILS # BLD AUTO: 0.02 X10(3)/MCL
BASOPHILS NFR BLD AUTO: 0.2 %
BILIRUB SERPL-MCNC: 0.4 MG/DL
BUN SERPL-MCNC: 12.8 MG/DL (ref 9.8–20.1)
CALCIUM SERPL-MCNC: 7.8 MG/DL (ref 8.4–10.2)
CHLORIDE SERPL-SCNC: 107 MMOL/L (ref 98–107)
CO2 SERPL-SCNC: 25 MMOL/L (ref 23–31)
CREAT SERPL-MCNC: 0.81 MG/DL (ref 0.55–1.02)
CREAT/UREA NIT SERPL: 16
EOSINOPHIL # BLD AUTO: 0 X10(3)/MCL (ref 0–0.9)
EOSINOPHIL NFR BLD AUTO: 0 %
ERYTHROCYTE [DISTWIDTH] IN BLOOD BY AUTOMATED COUNT: 13.8 % (ref 11.5–17)
GFR SERPLBLD CREATININE-BSD FMLA CKD-EPI: >60 ML/MIN/1.73/M2
GLOBULIN SER-MCNC: 2.4 GM/DL (ref 2.4–3.5)
GLUCOSE SERPL-MCNC: 160 MG/DL (ref 82–115)
GROUP & RH: ABNORMAL
HCT VFR BLD AUTO: 32.3 % (ref 37–47)
HGB BLD-MCNC: 10.4 G/DL (ref 12–16)
IMM GRANULOCYTES # BLD AUTO: 0.07 X10(3)/MCL (ref 0–0.04)
IMM GRANULOCYTES NFR BLD AUTO: 0.6 %
INDIRECT COOMBS: ABNORMAL
LYMPHOCYTES # BLD AUTO: 0.61 X10(3)/MCL (ref 0.6–4.6)
LYMPHOCYTES NFR BLD AUTO: 5.4 %
MCH RBC QN AUTO: 29.1 PG (ref 27–31)
MCHC RBC AUTO-ENTMCNC: 32.2 G/DL (ref 33–36)
MCV RBC AUTO: 90.5 FL (ref 80–94)
MONOCYTES # BLD AUTO: 0.07 X10(3)/MCL (ref 0.1–1.3)
MONOCYTES NFR BLD AUTO: 0.6 %
NEUTROPHILS # BLD AUTO: 10.6 X10(3)/MCL (ref 2.1–9.2)
NEUTROPHILS NFR BLD AUTO: 93.2 %
NRBC BLD AUTO-RTO: 0 %
PLATELET # BLD AUTO: 237 X10(3)/MCL (ref 130–400)
PMV BLD AUTO: 10.6 FL (ref 7.4–10.4)
POTASSIUM SERPL-SCNC: 3.9 MMOL/L (ref 3.5–5.1)
PROT SERPL-MCNC: 6 GM/DL (ref 5.8–7.6)
RBC # BLD AUTO: 3.57 X10(6)/MCL (ref 4.2–5.4)
SODIUM SERPL-SCNC: 138 MMOL/L (ref 136–145)
SPECIMEN OUTDATE: ABNORMAL
WBC # BLD AUTO: 11.37 X10(3)/MCL (ref 4.5–11.5)

## 2025-08-19 PROCEDURE — 11000001 HC ACUTE MED/SURG PRIVATE ROOM

## 2025-08-19 PROCEDURE — 71000016 HC POSTOP RECOV ADDL HR: Performed by: STUDENT IN AN ORGANIZED HEALTH CARE EDUCATION/TRAINING PROGRAM

## 2025-08-19 PROCEDURE — 63047 LAM FACETEC & FORAMOT LUMBAR: CPT | Mod: 51,,, | Performed by: STUDENT IN AN ORGANIZED HEALTH CARE EDUCATION/TRAINING PROGRAM

## 2025-08-19 PROCEDURE — 22840 INSERT SPINE FIXATION DEVICE: CPT | Mod: ,,, | Performed by: STUDENT IN AN ORGANIZED HEALTH CARE EDUCATION/TRAINING PROGRAM

## 2025-08-19 PROCEDURE — 36000712 HC OR TIME LEV V 1ST 15 MIN: Performed by: STUDENT IN AN ORGANIZED HEALTH CARE EDUCATION/TRAINING PROGRAM

## 2025-08-19 PROCEDURE — 37000008 HC ANESTHESIA 1ST 15 MINUTES: Performed by: STUDENT IN AN ORGANIZED HEALTH CARE EDUCATION/TRAINING PROGRAM

## 2025-08-19 PROCEDURE — P9045 ALBUMIN (HUMAN), 5%, 250 ML: HCPCS | Mod: JZ,TB | Performed by: ANESTHESIOLOGY

## 2025-08-19 PROCEDURE — 00NY0ZZ RELEASE LUMBAR SPINAL CORD, OPEN APPROACH: ICD-10-PCS | Performed by: STUDENT IN AN ORGANIZED HEALTH CARE EDUCATION/TRAINING PROGRAM

## 2025-08-19 PROCEDURE — 25000003 PHARM REV CODE 250

## 2025-08-19 PROCEDURE — 80053 COMPREHEN METABOLIC PANEL: CPT

## 2025-08-19 PROCEDURE — 27000221 HC OXYGEN, UP TO 24 HOURS

## 2025-08-19 PROCEDURE — 63600175 PHARM REV CODE 636 W HCPCS

## 2025-08-19 PROCEDURE — 63600175 PHARM REV CODE 636 W HCPCS: Performed by: STUDENT IN AN ORGANIZED HEALTH CARE EDUCATION/TRAINING PROGRAM

## 2025-08-19 PROCEDURE — 20936 SP BONE AGRFT LOCAL ADD-ON: CPT | Mod: ,,, | Performed by: STUDENT IN AN ORGANIZED HEALTH CARE EDUCATION/TRAINING PROGRAM

## 2025-08-19 PROCEDURE — 37000009 HC ANESTHESIA EA ADD 15 MINS: Performed by: STUDENT IN AN ORGANIZED HEALTH CARE EDUCATION/TRAINING PROGRAM

## 2025-08-19 PROCEDURE — C1713 ANCHOR/SCREW BN/BN,TIS/BN: HCPCS | Performed by: STUDENT IN AN ORGANIZED HEALTH CARE EDUCATION/TRAINING PROGRAM

## 2025-08-19 PROCEDURE — 25000003 PHARM REV CODE 250: Performed by: ANESTHESIOLOGY

## 2025-08-19 PROCEDURE — 86905 BLOOD TYPING RBC ANTIGENS: CPT | Performed by: STUDENT IN AN ORGANIZED HEALTH CARE EDUCATION/TRAINING PROGRAM

## 2025-08-19 PROCEDURE — 36000713 HC OR TIME LEV V EA ADD 15 MIN: Performed by: STUDENT IN AN ORGANIZED HEALTH CARE EDUCATION/TRAINING PROGRAM

## 2025-08-19 PROCEDURE — 8E0W0CZ ROBOTIC ASSISTED PROCEDURE OF TRUNK REGION, OPEN APPROACH: ICD-10-PCS | Performed by: STUDENT IN AN ORGANIZED HEALTH CARE EDUCATION/TRAINING PROGRAM

## 2025-08-19 PROCEDURE — 71000039 HC RECOVERY, EACH ADD'L HOUR: Performed by: STUDENT IN AN ORGANIZED HEALTH CARE EDUCATION/TRAINING PROGRAM

## 2025-08-19 PROCEDURE — 86900 BLOOD TYPING SEROLOGIC ABO: CPT | Performed by: STUDENT IN AN ORGANIZED HEALTH CARE EDUCATION/TRAINING PROGRAM

## 2025-08-19 PROCEDURE — 86870 RBC ANTIBODY IDENTIFICATION: CPT | Performed by: STUDENT IN AN ORGANIZED HEALTH CARE EDUCATION/TRAINING PROGRAM

## 2025-08-19 PROCEDURE — 27800903 OPTIME MED/SURG SUP & DEVICES OTHER IMPLANTS: Performed by: STUDENT IN AN ORGANIZED HEALTH CARE EDUCATION/TRAINING PROGRAM

## 2025-08-19 PROCEDURE — 71000033 HC RECOVERY, INTIAL HOUR: Performed by: STUDENT IN AN ORGANIZED HEALTH CARE EDUCATION/TRAINING PROGRAM

## 2025-08-19 PROCEDURE — 61783 SCAN PROC SPINAL: CPT | Mod: XU,,, | Performed by: STUDENT IN AN ORGANIZED HEALTH CARE EDUCATION/TRAINING PROGRAM

## 2025-08-19 PROCEDURE — 63600175 PHARM REV CODE 636 W HCPCS: Performed by: ANESTHESIOLOGY

## 2025-08-19 PROCEDURE — 85025 COMPLETE CBC W/AUTO DIFF WBC: CPT

## 2025-08-19 PROCEDURE — 0SG0071 FUSION OF LUMBAR VERTEBRAL JOINT WITH AUTOLOGOUS TISSUE SUBSTITUTE, POSTERIOR APPROACH, POSTERIOR COLUMN, OPEN APPROACH: ICD-10-PCS | Performed by: STUDENT IN AN ORGANIZED HEALTH CARE EDUCATION/TRAINING PROGRAM

## 2025-08-19 PROCEDURE — 27201423 OPTIME MED/SURG SUP & DEVICES STERILE SUPPLY: Performed by: STUDENT IN AN ORGANIZED HEALTH CARE EDUCATION/TRAINING PROGRAM

## 2025-08-19 PROCEDURE — 22612 ARTHRD PST TQ 1NTRSPC LUMBAR: CPT | Mod: ,,, | Performed by: STUDENT IN AN ORGANIZED HEALTH CARE EDUCATION/TRAINING PROGRAM

## 2025-08-19 PROCEDURE — 20930 SP BONE ALGRFT MORSEL ADD-ON: CPT | Mod: ,,, | Performed by: STUDENT IN AN ORGANIZED HEALTH CARE EDUCATION/TRAINING PROGRAM

## 2025-08-19 PROCEDURE — 01NB0ZZ RELEASE LUMBAR NERVE, OPEN APPROACH: ICD-10-PCS | Performed by: STUDENT IN AN ORGANIZED HEALTH CARE EDUCATION/TRAINING PROGRAM

## 2025-08-19 PROCEDURE — 71000015 HC POSTOP RECOV 1ST HR: Performed by: STUDENT IN AN ORGANIZED HEALTH CARE EDUCATION/TRAINING PROGRAM

## 2025-08-19 PROCEDURE — 8E0WXBZ COMPUTER ASSISTED PROCEDURE OF TRUNK REGION: ICD-10-PCS | Performed by: STUDENT IN AN ORGANIZED HEALTH CARE EDUCATION/TRAINING PROGRAM

## 2025-08-19 DEVICE — PUTTY DBM GRAFTON T43103 2.5CC: Type: IMPLANTABLE DEVICE | Site: SPINE LUMBAR | Status: FUNCTIONAL

## 2025-08-19 DEVICE — GRAFT CHIPS FD 4-10MM 15CC: Type: IMPLANTABLE DEVICE | Site: SPINE LUMBAR | Status: FUNCTIONAL

## 2025-08-19 DEVICE — IMPLANTABLE DEVICE: Type: IMPLANTABLE DEVICE | Site: SPINE LUMBAR | Status: FUNCTIONAL

## 2025-08-19 DEVICE — SCREW HORIZON SOLERA 6.5X45MM: Type: IMPLANTABLE DEVICE | Site: SPINE LUMBAR | Status: FUNCTIONAL

## 2025-08-19 DEVICE — SET SCREW HORIZON SOLERA 5.5-6: Type: IMPLANTABLE DEVICE | Site: SPINE LUMBAR | Status: FUNCTIONAL

## 2025-08-19 RX ORDER — CEFAZOLIN SODIUM 2 G/50ML
2 SOLUTION INTRAVENOUS
Status: COMPLETED | OUTPATIENT
Start: 2025-08-19 | End: 2025-08-20

## 2025-08-19 RX ORDER — AMOXICILLIN 250 MG
2 CAPSULE ORAL NIGHTLY PRN
Status: DISCONTINUED | OUTPATIENT
Start: 2025-08-19 | End: 2025-08-22 | Stop reason: HOSPADM

## 2025-08-19 RX ORDER — ALUMINUM HYDROXIDE, MAGNESIUM HYDROXIDE, AND SIMETHICONE 1200; 120; 1200 MG/30ML; MG/30ML; MG/30ML
30 SUSPENSION ORAL EVERY 4 HOURS PRN
Status: DISCONTINUED | OUTPATIENT
Start: 2025-08-19 | End: 2025-08-22 | Stop reason: HOSPADM

## 2025-08-19 RX ORDER — ATORVASTATIN CALCIUM 40 MG/1
40 TABLET, FILM COATED ORAL NIGHTLY
Status: DISCONTINUED | OUTPATIENT
Start: 2025-08-19 | End: 2025-08-22 | Stop reason: HOSPADM

## 2025-08-19 RX ORDER — ONDANSETRON HYDROCHLORIDE 2 MG/ML
INJECTION, SOLUTION INTRAVENOUS
Status: DISCONTINUED | OUTPATIENT
Start: 2025-08-19 | End: 2025-08-19

## 2025-08-19 RX ORDER — HYDROMORPHONE HYDROCHLORIDE 2 MG/ML
0.2 INJECTION, SOLUTION INTRAMUSCULAR; INTRAVENOUS; SUBCUTANEOUS EVERY 5 MIN PRN
Status: DISCONTINUED | OUTPATIENT
Start: 2025-08-19 | End: 2025-08-19 | Stop reason: HOSPADM

## 2025-08-19 RX ORDER — PROCHLORPERAZINE EDISYLATE 5 MG/ML
5 INJECTION INTRAMUSCULAR; INTRAVENOUS EVERY 6 HOURS PRN
Status: DISCONTINUED | OUTPATIENT
Start: 2025-08-19 | End: 2025-08-22 | Stop reason: HOSPADM

## 2025-08-19 RX ORDER — CEFAZOLIN SODIUM 1 G/3ML
INJECTION, POWDER, FOR SOLUTION INTRAMUSCULAR; INTRAVENOUS
Status: DISCONTINUED | OUTPATIENT
Start: 2025-08-19 | End: 2025-08-19 | Stop reason: HOSPADM

## 2025-08-19 RX ORDER — CEFAZOLIN SODIUM 1 G/3ML
2 INJECTION, POWDER, FOR SOLUTION INTRAMUSCULAR; INTRAVENOUS
Status: DISCONTINUED | OUTPATIENT
Start: 2025-08-19 | End: 2025-08-19 | Stop reason: HOSPADM

## 2025-08-19 RX ORDER — MIDAZOLAM HYDROCHLORIDE 1 MG/ML
INJECTION INTRAMUSCULAR; INTRAVENOUS
Status: DISCONTINUED | OUTPATIENT
Start: 2025-08-19 | End: 2025-08-19

## 2025-08-19 RX ORDER — BUPIVACAINE HYDROCHLORIDE 5 MG/ML
INJECTION, SOLUTION EPIDURAL; INTRACAUDAL; PERINEURAL
Status: DISCONTINUED | OUTPATIENT
Start: 2025-08-19 | End: 2025-08-19 | Stop reason: HOSPADM

## 2025-08-19 RX ORDER — HYDROCODONE BITARTRATE AND ACETAMINOPHEN 10; 325 MG/1; MG/1
1 TABLET ORAL EVERY 4 HOURS PRN
Refills: 0 | Status: DISCONTINUED | OUTPATIENT
Start: 2025-08-19 | End: 2025-08-22 | Stop reason: HOSPADM

## 2025-08-19 RX ORDER — LISINOPRIL 5 MG/1
5 TABLET ORAL DAILY
Status: DISCONTINUED | OUTPATIENT
Start: 2025-08-19 | End: 2025-08-22 | Stop reason: HOSPADM

## 2025-08-19 RX ORDER — DEXAMETHASONE SODIUM PHOSPHATE 4 MG/ML
INJECTION, SOLUTION INTRA-ARTICULAR; INTRALESIONAL; INTRAMUSCULAR; INTRAVENOUS; SOFT TISSUE
Status: DISCONTINUED | OUTPATIENT
Start: 2025-08-19 | End: 2025-08-19

## 2025-08-19 RX ORDER — POLYETHYLENE GLYCOL 3350 17 G/17G
17 POWDER, FOR SOLUTION ORAL 2 TIMES DAILY
Status: DISCONTINUED | OUTPATIENT
Start: 2025-08-19 | End: 2025-08-22 | Stop reason: HOSPADM

## 2025-08-19 RX ORDER — CEFAZOLIN 2 G/1
INJECTION, POWDER, FOR SOLUTION INTRAMUSCULAR; INTRAVENOUS
Status: DISCONTINUED | OUTPATIENT
Start: 2025-08-19 | End: 2025-08-19

## 2025-08-19 RX ORDER — ROCURONIUM BROMIDE 10 MG/ML
INJECTION, SOLUTION INTRAVENOUS
Status: DISCONTINUED | OUTPATIENT
Start: 2025-08-19 | End: 2025-08-19

## 2025-08-19 RX ORDER — FENTANYL CITRATE 50 UG/ML
INJECTION, SOLUTION INTRAMUSCULAR; INTRAVENOUS
Status: DISCONTINUED | OUTPATIENT
Start: 2025-08-19 | End: 2025-08-19

## 2025-08-19 RX ORDER — ESCITALOPRAM OXALATE 10 MG/1
20 TABLET ORAL NIGHTLY
Status: DISCONTINUED | OUTPATIENT
Start: 2025-08-19 | End: 2025-08-22 | Stop reason: HOSPADM

## 2025-08-19 RX ORDER — OXYCODONE HYDROCHLORIDE 5 MG/1
5 TABLET ORAL
Status: DISCONTINUED | OUTPATIENT
Start: 2025-08-19 | End: 2025-08-19 | Stop reason: HOSPADM

## 2025-08-19 RX ORDER — ONDANSETRON 4 MG/1
8 TABLET, ORALLY DISINTEGRATING ORAL EVERY 6 HOURS PRN
Status: DISCONTINUED | OUTPATIENT
Start: 2025-08-19 | End: 2025-08-22 | Stop reason: HOSPADM

## 2025-08-19 RX ORDER — MORPHINE SULFATE 4 MG/ML
1 INJECTION, SOLUTION INTRAMUSCULAR; INTRAVENOUS
Status: DISCONTINUED | OUTPATIENT
Start: 2025-08-19 | End: 2025-08-22 | Stop reason: HOSPADM

## 2025-08-19 RX ORDER — PROPOFOL 10 MG/ML
VIAL (ML) INTRAVENOUS
Status: DISCONTINUED | OUTPATIENT
Start: 2025-08-19 | End: 2025-08-19

## 2025-08-19 RX ORDER — GABAPENTIN 300 MG/1
600 CAPSULE ORAL NIGHTLY
Status: DISCONTINUED | OUTPATIENT
Start: 2025-08-20 | End: 2025-08-22 | Stop reason: HOSPADM

## 2025-08-19 RX ORDER — ALBUMIN HUMAN 50 G/1000ML
12.5 SOLUTION INTRAVENOUS ONCE
Status: COMPLETED | OUTPATIENT
Start: 2025-08-19 | End: 2025-08-19

## 2025-08-19 RX ORDER — SODIUM CHLORIDE 9 MG/ML
INJECTION, SOLUTION INTRAVENOUS CONTINUOUS
Status: DISCONTINUED | OUTPATIENT
Start: 2025-08-19 | End: 2025-08-20

## 2025-08-19 RX ORDER — METHOCARBAMOL 750 MG/1
750 TABLET, FILM COATED ORAL 4 TIMES DAILY
Status: DISCONTINUED | OUTPATIENT
Start: 2025-08-19 | End: 2025-08-21

## 2025-08-19 RX ORDER — PANTOPRAZOLE SODIUM 40 MG/1
40 TABLET, DELAYED RELEASE ORAL DAILY
Status: DISCONTINUED | OUTPATIENT
Start: 2025-08-19 | End: 2025-08-22 | Stop reason: HOSPADM

## 2025-08-19 RX ORDER — HYDRALAZINE HYDROCHLORIDE 20 MG/ML
10 INJECTION INTRAMUSCULAR; INTRAVENOUS EVERY 4 HOURS PRN
Status: DISCONTINUED | OUTPATIENT
Start: 2025-08-19 | End: 2025-08-22 | Stop reason: HOSPADM

## 2025-08-19 RX ORDER — HYDROCODONE BITARTRATE AND ACETAMINOPHEN 5; 325 MG/1; MG/1
1 TABLET ORAL EVERY 4 HOURS PRN
Refills: 0 | Status: DISCONTINUED | OUTPATIENT
Start: 2025-08-19 | End: 2025-08-22 | Stop reason: HOSPADM

## 2025-08-19 RX ORDER — BISACODYL 10 MG/1
10 SUPPOSITORY RECTAL DAILY PRN
Status: DISCONTINUED | OUTPATIENT
Start: 2025-08-19 | End: 2025-08-22 | Stop reason: HOSPADM

## 2025-08-19 RX ORDER — GABAPENTIN 300 MG/1
300 CAPSULE ORAL
Status: COMPLETED | OUTPATIENT
Start: 2025-08-19 | End: 2025-08-19

## 2025-08-19 RX ORDER — ACETAMINOPHEN 325 MG/1
650 TABLET ORAL EVERY 4 HOURS PRN
Status: DISCONTINUED | OUTPATIENT
Start: 2025-08-19 | End: 2025-08-22 | Stop reason: HOSPADM

## 2025-08-19 RX ORDER — GLUCAGON 1 MG
1 KIT INJECTION
Status: DISCONTINUED | OUTPATIENT
Start: 2025-08-19 | End: 2025-08-19 | Stop reason: HOSPADM

## 2025-08-19 RX ORDER — LABETALOL HYDROCHLORIDE 5 MG/ML
10 INJECTION, SOLUTION INTRAVENOUS EVERY 4 HOURS PRN
Status: DISCONTINUED | OUTPATIENT
Start: 2025-08-19 | End: 2025-08-22 | Stop reason: HOSPADM

## 2025-08-19 RX ORDER — HYDROMORPHONE HYDROCHLORIDE 2 MG/ML
0.4 INJECTION, SOLUTION INTRAMUSCULAR; INTRAVENOUS; SUBCUTANEOUS EVERY 5 MIN PRN
Status: DISCONTINUED | OUTPATIENT
Start: 2025-08-19 | End: 2025-08-19 | Stop reason: HOSPADM

## 2025-08-19 RX ORDER — ONDANSETRON HYDROCHLORIDE 2 MG/ML
4 INJECTION, SOLUTION INTRAVENOUS DAILY PRN
Status: DISCONTINUED | OUTPATIENT
Start: 2025-08-19 | End: 2025-08-19 | Stop reason: HOSPADM

## 2025-08-19 RX ORDER — SODIUM CHLORIDE 0.9 % (FLUSH) 0.9 %
10 SYRINGE (ML) INJECTION
Status: DISCONTINUED | OUTPATIENT
Start: 2025-08-19 | End: 2025-08-19 | Stop reason: HOSPADM

## 2025-08-19 RX ORDER — ACETAMINOPHEN 500 MG
1000 TABLET ORAL
Status: DISCONTINUED | OUTPATIENT
Start: 2025-08-19 | End: 2025-08-22 | Stop reason: HOSPADM

## 2025-08-19 RX ORDER — LIDOCAINE HYDROCHLORIDE 20 MG/ML
INJECTION, SOLUTION EPIDURAL; INFILTRATION; INTRACAUDAL; PERINEURAL
Status: DISCONTINUED | OUTPATIENT
Start: 2025-08-19 | End: 2025-08-19

## 2025-08-19 RX ORDER — MUPIROCIN 20 MG/G
OINTMENT TOPICAL 2 TIMES DAILY
Status: DISCONTINUED | OUTPATIENT
Start: 2025-08-19 | End: 2025-08-22 | Stop reason: HOSPADM

## 2025-08-19 RX ORDER — LIDOCAINE HYDROCHLORIDE AND EPINEPHRINE 5; 5 MG/ML; UG/ML
INJECTION, SOLUTION INFILTRATION; PERINEURAL
Status: DISCONTINUED | OUTPATIENT
Start: 2025-08-19 | End: 2025-08-19 | Stop reason: HOSPADM

## 2025-08-19 RX ORDER — EPHEDRINE SULFATE 50 MG/ML
INJECTION, SOLUTION INTRAVENOUS
Status: DISCONTINUED | OUTPATIENT
Start: 2025-08-19 | End: 2025-08-19

## 2025-08-19 RX ADMIN — METHOCARBAMOL 750 MG: 750 TABLET ORAL at 08:08

## 2025-08-19 RX ADMIN — MUPIROCIN: 20 OINTMENT TOPICAL at 08:08

## 2025-08-19 RX ADMIN — EPHEDRINE SULFATE 5 MG: 50 INJECTION INTRAVENOUS at 08:08

## 2025-08-19 RX ADMIN — EPHEDRINE SULFATE 10 MG: 50 INJECTION INTRAVENOUS at 08:08

## 2025-08-19 RX ADMIN — HYDROCODONE BITARTRATE AND ACETAMINOPHEN 1 TABLET: 10; 325 TABLET ORAL at 06:08

## 2025-08-19 RX ADMIN — EPHEDRINE SULFATE 5 MG: 50 INJECTION INTRAVENOUS at 12:08

## 2025-08-19 RX ADMIN — ROCURONIUM BROMIDE 40 MG: 10 SOLUTION INTRAVENOUS at 07:08

## 2025-08-19 RX ADMIN — CEFAZOLIN SODIUM 2 G: 2 SOLUTION INTRAVENOUS at 11:08

## 2025-08-19 RX ADMIN — DOCUSATE SODIUM 50 MG: 50 CAPSULE, LIQUID FILLED ORAL at 08:08

## 2025-08-19 RX ADMIN — FENTANYL CITRATE 25 MCG: 50 INJECTION, SOLUTION INTRAMUSCULAR; INTRAVENOUS at 10:08

## 2025-08-19 RX ADMIN — ALBUMIN (HUMAN) 12.5 G: 12.5 SOLUTION INTRAVENOUS at 12:08

## 2025-08-19 RX ADMIN — CEFAZOLIN 2 G: 2 INJECTION, POWDER, FOR SOLUTION INTRAMUSCULAR; INTRAVENOUS at 07:08

## 2025-08-19 RX ADMIN — LIDOCAINE HYDROCHLORIDE 80 MG: 20 INJECTION, SOLUTION EPIDURAL; INFILTRATION; INTRACAUDAL; PERINEURAL at 07:08

## 2025-08-19 RX ADMIN — MIDAZOLAM HYDROCHLORIDE 2 MG: 1 INJECTION, SOLUTION INTRAMUSCULAR; INTRAVENOUS at 07:08

## 2025-08-19 RX ADMIN — ATORVASTATIN CALCIUM 40 MG: 40 TABLET, FILM COATED ORAL at 08:08

## 2025-08-19 RX ADMIN — HYDROMORPHONE HYDROCHLORIDE 0.4 MG: 2 INJECTION INTRAMUSCULAR; INTRAVENOUS; SUBCUTANEOUS at 01:08

## 2025-08-19 RX ADMIN — SUGAMMADEX 200 MG: 100 INJECTION, SOLUTION INTRAVENOUS at 11:08

## 2025-08-19 RX ADMIN — ONDANSETRON 4 MG: 2 INJECTION INTRAMUSCULAR; INTRAVENOUS at 11:08

## 2025-08-19 RX ADMIN — METHOCARBAMOL 750 MG: 750 TABLET ORAL at 04:08

## 2025-08-19 RX ADMIN — CEFAZOLIN SODIUM 2 G: 2 SOLUTION INTRAVENOUS at 06:08

## 2025-08-19 RX ADMIN — ACETAMINOPHEN 1000 MG: 500 TABLET ORAL at 06:08

## 2025-08-19 RX ADMIN — PROPOFOL 130 MG: 10 INJECTION, EMULSION INTRAVENOUS at 07:08

## 2025-08-19 RX ADMIN — FENTANYL CITRATE 100 MCG: 50 INJECTION, SOLUTION INTRAMUSCULAR; INTRAVENOUS at 07:08

## 2025-08-19 RX ADMIN — SODIUM CHLORIDE, SODIUM GLUCONATE, SODIUM ACETATE, POTASSIUM CHLORIDE AND MAGNESIUM CHLORIDE: 526; 502; 368; 37; 30 INJECTION, SOLUTION INTRAVENOUS at 07:08

## 2025-08-19 RX ADMIN — DEXAMETHASONE SODIUM PHOSPHATE 8 MG: 4 INJECTION, SOLUTION INTRA-ARTICULAR; INTRALESIONAL; INTRAMUSCULAR; INTRAVENOUS; SOFT TISSUE at 07:08

## 2025-08-19 RX ADMIN — SODIUM CHLORIDE: 9 INJECTION, SOLUTION INTRAVENOUS at 04:08

## 2025-08-19 RX ADMIN — PROPOFOL 40 MG: 10 INJECTION, EMULSION INTRAVENOUS at 08:08

## 2025-08-19 RX ADMIN — ESCITALOPRAM OXALATE 20 MG: 10 TABLET ORAL at 08:08

## 2025-08-19 RX ADMIN — GABAPENTIN 300 MG: 300 CAPSULE ORAL at 06:08

## 2025-08-20 LAB
ALBUMIN SERPL-MCNC: 3.1 G/DL (ref 3.4–4.8)
ALBUMIN/GLOB SERPL: 1.3 RATIO (ref 1.1–2)
ALP SERPL-CCNC: 66 UNIT/L (ref 40–150)
ALT SERPL-CCNC: 15 UNIT/L (ref 0–55)
ANION GAP SERPL CALC-SCNC: 4 MEQ/L
AST SERPL-CCNC: 17 UNIT/L (ref 11–45)
BASOPHILS # BLD AUTO: 0.01 X10(3)/MCL
BASOPHILS NFR BLD AUTO: 0.1 %
BILIRUB SERPL-MCNC: 0.3 MG/DL
BUN SERPL-MCNC: 11.1 MG/DL (ref 9.8–20.1)
CALCIUM SERPL-MCNC: 7.5 MG/DL (ref 8.4–10.2)
CHLORIDE SERPL-SCNC: 110 MMOL/L (ref 98–107)
CO2 SERPL-SCNC: 26 MMOL/L (ref 23–31)
CREAT SERPL-MCNC: 0.75 MG/DL (ref 0.55–1.02)
CREAT/UREA NIT SERPL: 15
EOSINOPHIL # BLD AUTO: 0 X10(3)/MCL (ref 0–0.9)
EOSINOPHIL NFR BLD AUTO: 0 %
ERYTHROCYTE [DISTWIDTH] IN BLOOD BY AUTOMATED COUNT: 14.4 % (ref 11.5–17)
GFR SERPLBLD CREATININE-BSD FMLA CKD-EPI: >60 ML/MIN/1.73/M2
GLOBULIN SER-MCNC: 2.3 GM/DL (ref 2.4–3.5)
GLUCOSE SERPL-MCNC: 106 MG/DL (ref 82–115)
HCT VFR BLD AUTO: 27.5 % (ref 37–47)
HGB BLD-MCNC: 8.9 G/DL (ref 12–16)
IMM GRANULOCYTES # BLD AUTO: 0.01 X10(3)/MCL (ref 0–0.04)
IMM GRANULOCYTES NFR BLD AUTO: 0.1 %
LYMPHOCYTES # BLD AUTO: 1.43 X10(3)/MCL (ref 0.6–4.6)
LYMPHOCYTES NFR BLD AUTO: 18.6 %
MCH RBC QN AUTO: 28.8 PG (ref 27–31)
MCHC RBC AUTO-ENTMCNC: 32.4 G/DL (ref 33–36)
MCV RBC AUTO: 89 FL (ref 80–94)
MONOCYTES # BLD AUTO: 0.48 X10(3)/MCL (ref 0.1–1.3)
MONOCYTES NFR BLD AUTO: 6.2 %
NEUTROPHILS # BLD AUTO: 5.76 X10(3)/MCL (ref 2.1–9.2)
NEUTROPHILS NFR BLD AUTO: 75 %
NRBC BLD AUTO-RTO: 0 %
PLATELET # BLD AUTO: 221 X10(3)/MCL (ref 130–400)
PMV BLD AUTO: 10.6 FL (ref 7.4–10.4)
POTASSIUM SERPL-SCNC: 3.8 MMOL/L (ref 3.5–5.1)
PROT SERPL-MCNC: 5.4 GM/DL (ref 5.8–7.6)
RBC # BLD AUTO: 3.09 X10(6)/MCL (ref 4.2–5.4)
SODIUM SERPL-SCNC: 140 MMOL/L (ref 136–145)
WBC # BLD AUTO: 7.69 X10(3)/MCL (ref 4.5–11.5)

## 2025-08-20 PROCEDURE — 94799 UNLISTED PULMONARY SVC/PX: CPT | Mod: XB

## 2025-08-20 PROCEDURE — 11000001 HC ACUTE MED/SURG PRIVATE ROOM

## 2025-08-20 PROCEDURE — 94799 UNLISTED PULMONARY SVC/PX: CPT

## 2025-08-20 PROCEDURE — 97162 PT EVAL MOD COMPLEX 30 MIN: CPT

## 2025-08-20 PROCEDURE — 80053 COMPREHEN METABOLIC PANEL: CPT

## 2025-08-20 PROCEDURE — 25000003 PHARM REV CODE 250

## 2025-08-20 PROCEDURE — 99900035 HC TECH TIME PER 15 MIN (STAT)

## 2025-08-20 PROCEDURE — 97165 OT EVAL LOW COMPLEX 30 MIN: CPT

## 2025-08-20 PROCEDURE — 63600175 PHARM REV CODE 636 W HCPCS

## 2025-08-20 PROCEDURE — 99900031 HC PATIENT EDUCATION (STAT)

## 2025-08-20 PROCEDURE — 36415 COLL VENOUS BLD VENIPUNCTURE: CPT

## 2025-08-20 PROCEDURE — 85025 COMPLETE CBC W/AUTO DIFF WBC: CPT

## 2025-08-20 RX ORDER — HEPARIN SODIUM 5000 [USP'U]/ML
5000 INJECTION, SOLUTION INTRAVENOUS; SUBCUTANEOUS EVERY 8 HOURS
Status: DISCONTINUED | OUTPATIENT
Start: 2025-08-20 | End: 2025-08-22 | Stop reason: HOSPADM

## 2025-08-20 RX ADMIN — POLYETHYLENE GLYCOL 3350 17 G: 17 POWDER, FOR SOLUTION ORAL at 10:08

## 2025-08-20 RX ADMIN — POLYETHYLENE GLYCOL 3350 17 G: 17 POWDER, FOR SOLUTION ORAL at 09:08

## 2025-08-20 RX ADMIN — GABAPENTIN 600 MG: 300 CAPSULE ORAL at 10:08

## 2025-08-20 RX ADMIN — METHOCARBAMOL 750 MG: 750 TABLET ORAL at 05:08

## 2025-08-20 RX ADMIN — HYDROCODONE BITARTRATE AND ACETAMINOPHEN 1 TABLET: 5; 325 TABLET ORAL at 12:08

## 2025-08-20 RX ADMIN — ATORVASTATIN CALCIUM 40 MG: 40 TABLET, FILM COATED ORAL at 10:08

## 2025-08-20 RX ADMIN — PANTOPRAZOLE SODIUM 40 MG: 40 TABLET, DELAYED RELEASE ORAL at 09:08

## 2025-08-20 RX ADMIN — METHOCARBAMOL 750 MG: 750 TABLET ORAL at 09:08

## 2025-08-20 RX ADMIN — HYDROCODONE BITARTRATE AND ACETAMINOPHEN 1 TABLET: 5; 325 TABLET ORAL at 05:08

## 2025-08-20 RX ADMIN — METHOCARBAMOL 750 MG: 750 TABLET ORAL at 12:08

## 2025-08-20 RX ADMIN — DOCUSATE SODIUM 50 MG: 50 CAPSULE, LIQUID FILLED ORAL at 09:08

## 2025-08-20 RX ADMIN — SODIUM CHLORIDE: 9 INJECTION, SOLUTION INTRAVENOUS at 07:08

## 2025-08-20 RX ADMIN — DOCUSATE SODIUM 50 MG: 50 CAPSULE, LIQUID FILLED ORAL at 10:08

## 2025-08-20 RX ADMIN — HEPARIN SODIUM 5000 UNITS: 5000 INJECTION INTRAVENOUS; SUBCUTANEOUS at 10:08

## 2025-08-20 RX ADMIN — LISINOPRIL 5 MG: 5 TABLET ORAL at 09:08

## 2025-08-20 RX ADMIN — METHOCARBAMOL 750 MG: 750 TABLET ORAL at 10:08

## 2025-08-20 RX ADMIN — ESCITALOPRAM OXALATE 20 MG: 10 TABLET ORAL at 10:08

## 2025-08-20 RX ADMIN — HYDROCODONE BITARTRATE AND ACETAMINOPHEN 1 TABLET: 10; 325 TABLET ORAL at 10:08

## 2025-08-21 LAB
ALBUMIN SERPL-MCNC: 3.3 G/DL (ref 3.4–4.8)
ALBUMIN/GLOB SERPL: 1.2 RATIO (ref 1.1–2)
ALP SERPL-CCNC: 78 UNIT/L (ref 40–150)
ALT SERPL-CCNC: 10 UNIT/L (ref 0–55)
ANION GAP SERPL CALC-SCNC: 6 MEQ/L
AST SERPL-CCNC: 23 UNIT/L (ref 11–45)
BASOPHILS # BLD AUTO: 0.05 X10(3)/MCL
BASOPHILS NFR BLD AUTO: 0.5 %
BILIRUB SERPL-MCNC: 0.7 MG/DL
BUN SERPL-MCNC: 13.4 MG/DL (ref 9.8–20.1)
CALCIUM SERPL-MCNC: 8.3 MG/DL (ref 8.4–10.2)
CHLORIDE SERPL-SCNC: 106 MMOL/L (ref 98–107)
CO2 SERPL-SCNC: 27 MMOL/L (ref 23–31)
CREAT SERPL-MCNC: 0.82 MG/DL (ref 0.55–1.02)
CREAT/UREA NIT SERPL: 16
EOSINOPHIL # BLD AUTO: 0.09 X10(3)/MCL (ref 0–0.9)
EOSINOPHIL NFR BLD AUTO: 0.9 %
ERYTHROCYTE [DISTWIDTH] IN BLOOD BY AUTOMATED COUNT: 14.3 % (ref 11.5–17)
GFR SERPLBLD CREATININE-BSD FMLA CKD-EPI: >60 ML/MIN/1.73/M2
GLOBULIN SER-MCNC: 2.8 GM/DL (ref 2.4–3.5)
GLUCOSE SERPL-MCNC: 115 MG/DL (ref 82–115)
HCT VFR BLD AUTO: 29.1 % (ref 37–47)
HGB BLD-MCNC: 9.5 G/DL (ref 12–16)
IMM GRANULOCYTES # BLD AUTO: 0.03 X10(3)/MCL (ref 0–0.04)
IMM GRANULOCYTES NFR BLD AUTO: 0.3 %
LYMPHOCYTES # BLD AUTO: 1.27 X10(3)/MCL (ref 0.6–4.6)
LYMPHOCYTES NFR BLD AUTO: 12.2 %
MCH RBC QN AUTO: 29.3 PG (ref 27–31)
MCHC RBC AUTO-ENTMCNC: 32.6 G/DL (ref 33–36)
MCV RBC AUTO: 89.8 FL (ref 80–94)
MONOCYTES # BLD AUTO: 0.71 X10(3)/MCL (ref 0.1–1.3)
MONOCYTES NFR BLD AUTO: 6.8 %
NEUTROPHILS # BLD AUTO: 8.27 X10(3)/MCL (ref 2.1–9.2)
NEUTROPHILS NFR BLD AUTO: 79.3 %
NRBC BLD AUTO-RTO: 0 %
PLATELET # BLD AUTO: 217 X10(3)/MCL (ref 130–400)
PMV BLD AUTO: 10.5 FL (ref 7.4–10.4)
POTASSIUM SERPL-SCNC: 4.1 MMOL/L (ref 3.5–5.1)
PROT SERPL-MCNC: 6.1 GM/DL (ref 5.8–7.6)
RBC # BLD AUTO: 3.24 X10(6)/MCL (ref 4.2–5.4)
SODIUM SERPL-SCNC: 139 MMOL/L (ref 136–145)
WBC # BLD AUTO: 10.42 X10(3)/MCL (ref 4.5–11.5)

## 2025-08-21 PROCEDURE — 85025 COMPLETE CBC W/AUTO DIFF WBC: CPT

## 2025-08-21 PROCEDURE — 99900031 HC PATIENT EDUCATION (STAT)

## 2025-08-21 PROCEDURE — 94760 N-INVAS EAR/PLS OXIMETRY 1: CPT

## 2025-08-21 PROCEDURE — 94799 UNLISTED PULMONARY SVC/PX: CPT | Mod: XB

## 2025-08-21 PROCEDURE — 25000003 PHARM REV CODE 250

## 2025-08-21 PROCEDURE — 36415 COLL VENOUS BLD VENIPUNCTURE: CPT

## 2025-08-21 PROCEDURE — 63600175 PHARM REV CODE 636 W HCPCS

## 2025-08-21 PROCEDURE — 11000001 HC ACUTE MED/SURG PRIVATE ROOM

## 2025-08-21 PROCEDURE — 80053 COMPREHEN METABOLIC PANEL: CPT

## 2025-08-21 RX ORDER — METHOCARBAMOL 750 MG/1
750 TABLET, FILM COATED ORAL 3 TIMES DAILY
Status: DISCONTINUED | OUTPATIENT
Start: 2025-08-21 | End: 2025-08-22 | Stop reason: HOSPADM

## 2025-08-21 RX ORDER — METHOCARBAMOL 750 MG/1
750 TABLET, FILM COATED ORAL 4 TIMES DAILY
Qty: 40 TABLET | Refills: 0 | Status: CANCELLED | OUTPATIENT
Start: 2025-08-21 | End: 2025-08-31

## 2025-08-21 RX ADMIN — GABAPENTIN 600 MG: 300 CAPSULE ORAL at 09:08

## 2025-08-21 RX ADMIN — METHOCARBAMOL 750 MG: 750 TABLET ORAL at 01:08

## 2025-08-21 RX ADMIN — DOCUSATE SODIUM 50 MG: 50 CAPSULE, LIQUID FILLED ORAL at 09:08

## 2025-08-21 RX ADMIN — METHOCARBAMOL 750 MG: 750 TABLET ORAL at 09:08

## 2025-08-21 RX ADMIN — HYDROCODONE BITARTRATE AND ACETAMINOPHEN 1 TABLET: 10; 325 TABLET ORAL at 05:08

## 2025-08-21 RX ADMIN — HEPARIN SODIUM 5000 UNITS: 5000 INJECTION INTRAVENOUS; SUBCUTANEOUS at 09:08

## 2025-08-21 RX ADMIN — ESCITALOPRAM OXALATE 20 MG: 10 TABLET ORAL at 09:08

## 2025-08-21 RX ADMIN — HYDROCODONE BITARTRATE AND ACETAMINOPHEN 1 TABLET: 5; 325 TABLET ORAL at 04:08

## 2025-08-21 RX ADMIN — ATORVASTATIN CALCIUM 40 MG: 40 TABLET, FILM COATED ORAL at 09:08

## 2025-08-21 RX ADMIN — HEPARIN SODIUM 5000 UNITS: 5000 INJECTION INTRAVENOUS; SUBCUTANEOUS at 01:08

## 2025-08-21 RX ADMIN — HEPARIN SODIUM 5000 UNITS: 5000 INJECTION INTRAVENOUS; SUBCUTANEOUS at 05:08

## 2025-08-21 RX ADMIN — HYDROCODONE BITARTRATE AND ACETAMINOPHEN 1 TABLET: 5; 325 TABLET ORAL at 11:08

## 2025-08-21 RX ADMIN — LISINOPRIL 5 MG: 5 TABLET ORAL at 09:08

## 2025-08-21 RX ADMIN — PANTOPRAZOLE SODIUM 40 MG: 40 TABLET, DELAYED RELEASE ORAL at 09:08

## 2025-08-22 VITALS
RESPIRATION RATE: 18 BRPM | TEMPERATURE: 98 F | SYSTOLIC BLOOD PRESSURE: 112 MMHG | HEIGHT: 61 IN | OXYGEN SATURATION: 98 % | HEART RATE: 73 BPM | WEIGHT: 212.5 LBS | BODY MASS INDEX: 40.12 KG/M2 | DIASTOLIC BLOOD PRESSURE: 64 MMHG

## 2025-08-22 PROCEDURE — 94799 UNLISTED PULMONARY SVC/PX: CPT | Mod: XB

## 2025-08-22 PROCEDURE — 94760 N-INVAS EAR/PLS OXIMETRY 1: CPT

## 2025-08-22 PROCEDURE — 25000003 PHARM REV CODE 250

## 2025-08-22 PROCEDURE — 99900031 HC PATIENT EDUCATION (STAT)

## 2025-08-22 PROCEDURE — 63600175 PHARM REV CODE 636 W HCPCS

## 2025-08-22 PROCEDURE — 99900035 HC TECH TIME PER 15 MIN (STAT)

## 2025-08-22 RX ORDER — METHOCARBAMOL 750 MG/1
750 TABLET, FILM COATED ORAL 3 TIMES DAILY
Qty: 42 TABLET | Refills: 0 | Status: SHIPPED | OUTPATIENT
Start: 2025-08-22 | End: 2025-09-05

## 2025-08-22 RX ORDER — HYDROCODONE BITARTRATE AND ACETAMINOPHEN 10; 325 MG/1; MG/1
1 TABLET ORAL EVERY 4 HOURS PRN
Qty: 42 TABLET | Refills: 0 | Status: SHIPPED | OUTPATIENT
Start: 2025-08-22 | End: 2025-08-29

## 2025-08-22 RX ADMIN — HEPARIN SODIUM 5000 UNITS: 5000 INJECTION INTRAVENOUS; SUBCUTANEOUS at 05:08

## 2025-08-22 RX ADMIN — METHOCARBAMOL 750 MG: 750 TABLET ORAL at 09:08

## 2025-08-22 RX ADMIN — DOCUSATE SODIUM 50 MG: 50 CAPSULE, LIQUID FILLED ORAL at 09:08

## 2025-08-22 RX ADMIN — HYDROCODONE BITARTRATE AND ACETAMINOPHEN 1 TABLET: 5; 325 TABLET ORAL at 09:08

## 2025-08-22 RX ADMIN — PANTOPRAZOLE SODIUM 40 MG: 40 TABLET, DELAYED RELEASE ORAL at 09:08

## 2025-08-22 RX ADMIN — HYDROCODONE BITARTRATE AND ACETAMINOPHEN 1 TABLET: 5; 325 TABLET ORAL at 03:08

## 2025-08-22 RX ADMIN — LISINOPRIL 5 MG: 5 TABLET ORAL at 09:08

## 2025-08-22 RX ADMIN — POLYETHYLENE GLYCOL 3350 17 G: 17 POWDER, FOR SOLUTION ORAL at 09:08

## 2025-08-22 RX ADMIN — MUPIROCIN: 20 OINTMENT TOPICAL at 09:08

## 2025-08-25 ENCOUNTER — PATIENT OUTREACH (OUTPATIENT)
Dept: ADMINISTRATIVE | Facility: CLINIC | Age: 65
End: 2025-08-25
Payer: MEDICARE

## 2025-08-26 ENCOUNTER — PATIENT OUTREACH (OUTPATIENT)
Dept: ADMINISTRATIVE | Facility: OTHER | Age: 65
End: 2025-08-26
Payer: MEDICARE

## 2025-09-02 ENCOUNTER — OFFICE VISIT (OUTPATIENT)
Dept: FAMILY MEDICINE | Facility: CLINIC | Age: 65
End: 2025-09-02
Payer: MEDICARE

## 2025-09-02 VITALS
DIASTOLIC BLOOD PRESSURE: 52 MMHG | BODY MASS INDEX: 40.59 KG/M2 | WEIGHT: 215 LBS | RESPIRATION RATE: 20 BRPM | SYSTOLIC BLOOD PRESSURE: 93 MMHG | HEIGHT: 61 IN | HEART RATE: 81 BPM | TEMPERATURE: 98 F

## 2025-09-02 DIAGNOSIS — E78.5 HYPERLIPIDEMIA, UNSPECIFIED HYPERLIPIDEMIA TYPE: ICD-10-CM

## 2025-09-02 DIAGNOSIS — E66.813 CLASS 3 SEVERE OBESITY DUE TO EXCESS CALORIES WITH SERIOUS COMORBIDITY AND BODY MASS INDEX (BMI) OF 40.0 TO 44.9 IN ADULT: ICD-10-CM

## 2025-09-02 DIAGNOSIS — F32.89 OTHER DEPRESSION: ICD-10-CM

## 2025-09-02 DIAGNOSIS — F41.9 ANXIETY: ICD-10-CM

## 2025-09-02 DIAGNOSIS — I10 HYPERTENSION, UNSPECIFIED TYPE: ICD-10-CM

## 2025-09-02 PROBLEM — M54.42 CHRONIC BILATERAL LOW BACK PAIN WITH BILATERAL SCIATICA: Status: RESOLVED | Noted: 2023-10-13 | Resolved: 2025-09-02

## 2025-09-02 PROBLEM — M54.9 DORSALGIA, UNSPECIFIED: Status: RESOLVED | Noted: 2022-06-29 | Resolved: 2025-09-02

## 2025-09-02 PROBLEM — M54.41 CHRONIC BILATERAL LOW BACK PAIN WITH BILATERAL SCIATICA: Status: RESOLVED | Noted: 2023-10-13 | Resolved: 2025-09-02

## 2025-09-02 PROBLEM — G89.29 CHRONIC BILATERAL LOW BACK PAIN WITH BILATERAL SCIATICA: Status: RESOLVED | Noted: 2023-10-13 | Resolved: 2025-09-02

## 2025-09-02 PROCEDURE — 1159F MED LIST DOCD IN RCRD: CPT | Mod: ,,, | Performed by: NURSE PRACTITIONER

## 2025-09-02 PROCEDURE — 1126F AMNT PAIN NOTED NONE PRSNT: CPT | Mod: ,,, | Performed by: NURSE PRACTITIONER

## 2025-09-02 PROCEDURE — 3078F DIAST BP <80 MM HG: CPT | Mod: ,,, | Performed by: NURSE PRACTITIONER

## 2025-09-02 PROCEDURE — 4010F ACE/ARB THERAPY RXD/TAKEN: CPT | Mod: ,,, | Performed by: NURSE PRACTITIONER

## 2025-09-02 PROCEDURE — 99214 OFFICE O/P EST MOD 30 MIN: CPT | Mod: ,,, | Performed by: NURSE PRACTITIONER

## 2025-09-02 PROCEDURE — 3008F BODY MASS INDEX DOCD: CPT | Mod: ,,, | Performed by: NURSE PRACTITIONER

## 2025-09-02 PROCEDURE — 1160F RVW MEDS BY RX/DR IN RCRD: CPT | Mod: ,,, | Performed by: NURSE PRACTITIONER

## 2025-09-02 PROCEDURE — 3074F SYST BP LT 130 MM HG: CPT | Mod: ,,, | Performed by: NURSE PRACTITIONER

## 2025-09-02 PROCEDURE — 3044F HG A1C LEVEL LT 7.0%: CPT | Mod: ,,, | Performed by: NURSE PRACTITIONER

## 2025-09-02 PROCEDURE — 1111F DSCHRG MED/CURRENT MED MERGE: CPT | Mod: ,,, | Performed by: NURSE PRACTITIONER

## 2025-09-03 ENCOUNTER — OFFICE VISIT (OUTPATIENT)
Dept: NEUROSURGERY | Facility: CLINIC | Age: 65
End: 2025-09-03
Payer: MEDICARE

## 2025-09-03 ENCOUNTER — HOSPITAL ENCOUNTER (OUTPATIENT)
Dept: RADIOLOGY | Facility: HOSPITAL | Age: 65
Discharge: HOME | End: 2025-09-03
Payer: MEDICARE

## 2025-09-03 VITALS
TEMPERATURE: 97 F | RESPIRATION RATE: 16 BRPM | WEIGHT: 213.38 LBS | HEART RATE: 61 BPM | BODY MASS INDEX: 40.29 KG/M2 | SYSTOLIC BLOOD PRESSURE: 111 MMHG | DIASTOLIC BLOOD PRESSURE: 72 MMHG | HEIGHT: 61 IN

## 2025-09-03 DIAGNOSIS — M48.062 LUMBAR STENOSIS WITH NEUROGENIC CLAUDICATION: ICD-10-CM

## 2025-09-03 DIAGNOSIS — Z98.1 S/P LUMBAR SPINAL FUSION: Primary | ICD-10-CM

## 2025-09-03 PROCEDURE — 1126F AMNT PAIN NOTED NONE PRSNT: CPT | Mod: CPTII,,,

## 2025-09-03 PROCEDURE — 3078F DIAST BP <80 MM HG: CPT | Mod: CPTII,,,

## 2025-09-03 PROCEDURE — 1101F PT FALLS ASSESS-DOCD LE1/YR: CPT | Mod: CPTII,,,

## 2025-09-03 PROCEDURE — 3044F HG A1C LEVEL LT 7.0%: CPT | Mod: CPTII,,,

## 2025-09-03 PROCEDURE — 3288F FALL RISK ASSESSMENT DOCD: CPT | Mod: CPTII,,,

## 2025-09-03 PROCEDURE — 1159F MED LIST DOCD IN RCRD: CPT | Mod: CPTII,,,

## 2025-09-03 PROCEDURE — 72100 X-RAY EXAM L-S SPINE 2/3 VWS: CPT | Mod: TC

## 2025-09-03 PROCEDURE — 99024 POSTOP FOLLOW-UP VISIT: CPT | Mod: ,,,

## 2025-09-03 PROCEDURE — 3074F SYST BP LT 130 MM HG: CPT | Mod: CPTII,,,

## 2025-09-03 PROCEDURE — 1160F RVW MEDS BY RX/DR IN RCRD: CPT | Mod: CPTII,,,

## 2025-09-03 PROCEDURE — 4010F ACE/ARB THERAPY RXD/TAKEN: CPT | Mod: CPTII,,,

## (undated) DEVICE — ELECTRODE E-CLEAN MOD 4IN

## (undated) DEVICE — DRAPE MEDIUM SHEET 40X70IN

## (undated) DEVICE — Device

## (undated) DEVICE — KIT SURGIFLO HEMOSTATIC MATRIX

## (undated) DEVICE — COVER HD BACK TABLE 6FT

## (undated) DEVICE — SUT VICRYL PLUS 0 CT-1 18IN

## (undated) DEVICE — SPHERE NDI PASSIVE

## (undated) DEVICE — SPONGE GAUZE 16PLY 4X4

## (undated) DEVICE — GLOVE PROTEXIS PI SYN SURG 7

## (undated) DEVICE — KIT SURGICAL TURNOVER

## (undated) DEVICE — KIT DRAIN WOUND RND SPRNG RESV

## (undated) DEVICE — TOOL MR8 MATCH HEAD 14CM 3MM

## (undated) DEVICE — DRAPE TOP 53X102IN

## (undated) DEVICE — KIT POS JACKSON TABLE NO HDRST

## (undated) DEVICE — DRAPE ORTH SPLIT 77X108IN

## (undated) DEVICE — PROBE BALL TIP 2.3MM

## (undated) DEVICE — TUBE SUCTION FRAZIER VENT 10FR

## (undated) DEVICE — PILLOW HEAD REST

## (undated) DEVICE — DRESSING TELFA N ADH 3X8

## (undated) DEVICE — DRAPE C-ARM COVER EZ 36X28IN

## (undated) DEVICE — SOL NACL IRR 1000ML BTL

## (undated) DEVICE — APPLICATOR CHLORAPREP ORN 26ML

## (undated) DEVICE — DRAPE FLUID WARMER ORS 44X44IN

## (undated) DEVICE — GLOVE PROTEXIS BLUE LATEX 7.5

## (undated) DEVICE — BENZOIN TINCTURE 0.66ML

## (undated) DEVICE — TRAY CATH 1-LYR URIMTR 16FR

## (undated) DEVICE — DRAPE C-ARMOR EQUIPMENT COVER

## (undated) DEVICE — NDL HYPO 22GX1 1/2 SYR 10ML LL

## (undated) DEVICE — PAD DERMAPROX XL 22X14X.5IN

## (undated) DEVICE — SPONGE COTTON TRAY 4X4IN

## (undated) DEVICE — DRESSING XEROFORM NONADH 1X8IN

## (undated) DEVICE — SPONGE NEURO PATTIE 1/2X1/2IN

## (undated) DEVICE — STAPLER SKIN PROXIMATE WIDE

## (undated) DEVICE — DRESSING TRANS 4X4 3/4

## (undated) DEVICE — GLOVE PROTEXIS BLUE LATEX 8

## (undated) DEVICE — KIT MAZOR X SPINE DISPOSABLE

## (undated) DEVICE — GLOVE PROTEXIS PI SYN SURG 7.5

## (undated) DEVICE — SUT VICRYL 2-0 8-18 CP-2

## (undated) DEVICE — BLADE SURG STAINLESS STEEL #22

## (undated) DEVICE — ELECTRODE BLADE INSULATED 1 IN

## (undated) DEVICE — SUT 3-0 MONOCRYL PLUS PS-2

## (undated) DEVICE — DRAPE OPMI STERILE

## (undated) DEVICE — TUBING SILICON CLR 3/16IN 10FT

## (undated) DEVICE — DRAPE SURG W/TWL 17 5/8X23

## (undated) DEVICE — PENCIL SMOKE EVAC TELSCP 15FT

## (undated) DEVICE — SPONGE SURGIFOAM 100 8.5X12X10